# Patient Record
Sex: FEMALE | Race: WHITE | URBAN - METROPOLITAN AREA
[De-identification: names, ages, dates, MRNs, and addresses within clinical notes are randomized per-mention and may not be internally consistent; named-entity substitution may affect disease eponyms.]

---

## 2019-07-16 ENCOUNTER — DOCTOR'S OFFICE (OUTPATIENT)
Dept: URBAN - METROPOLITAN AREA CLINIC 136 | Facility: CLINIC | Age: 49
Setting detail: OPHTHALMOLOGY
End: 2019-07-16
Payer: COMMERCIAL

## 2019-07-16 DIAGNOSIS — H52.4: ICD-10-CM

## 2019-07-16 DIAGNOSIS — H52.13: ICD-10-CM

## 2019-07-16 PROCEDURE — 92014 COMPRE OPH EXAM EST PT 1/>: CPT | Performed by: OPTOMETRIST

## 2019-07-16 PROCEDURE — 92015 DETERMINE REFRACTIVE STATE: CPT | Performed by: OPTOMETRIST

## 2019-07-16 ASSESSMENT — KERATOMETRY
OS_K2POWER_DIOPTERS: 43.50
OD_K1POWER_DIOPTERS: 42.75
OD_K2POWER_DIOPTERS: 43.25
OS_AXISANGLE_DEGREES: 006
OD_AXISANGLE_DEGREES: 017
OS_K1POWER_DIOPTERS: 42.50

## 2019-07-16 ASSESSMENT — REFRACTION_CURRENTRX
OD_CYLINDER: -0.25
OS_OVR_VA: 20/
OD_OVR_VA: 20/
OS_VPRISM_DIRECTION: PROGS
OS_ADD: +1.25
OD_VPRISM_DIRECTION: PROGS
OD_AXIS: 040
OS_SPHERE: -5.75
OS_OVR_VA: 20/
OS_CYLINDER: -0.50
OS_OVR_VA: 20/
OD_SPHERE: -6.75
OD_OVR_VA: 20/
OS_AXIS: 180
OD_OVR_VA: 20/
OD_ADD: +1.25

## 2019-07-16 ASSESSMENT — SPHEQUIV_DERIVED
OD_SPHEQUIV: -7.375
OS_SPHEQUIV: -6.5
OD_SPHEQUIV: -7.125
OS_SPHEQUIV: -6.5

## 2019-07-16 ASSESSMENT — REFRACTION_AUTOREFRACTION
OD_CYLINDER: -0.75
OD_AXIS: 009
OD_SPHERE: -7.00
OS_SPHERE: -6.25
OS_AXIS: 011
OS_CYLINDER: -0.50

## 2019-07-16 ASSESSMENT — REFRACTION_MANIFEST
OS_AXIS: 180
OD_SPHERE: -7.00
OS_ADD: +2.00
OS_VA2: 20/
OS_VA1: 20/20
OS_CYLINDER: -0.50
OS_VA1: 20/
OD_VA2: 20/20 OU
OS_VA3: 20/
OD_VA3: 20/
OD_VA1: 20/
OD_VA2: 20/
OD_VA3: 20/
OS_VA3: 20/
OD_VA1: 20/20
OD_AXIS: 045
OU_VA: 20/
OS_VA2: 20/
OD_CYLINDER: -0.25
OS_SPHERE: -6.25
OD_ADD: +2.00
OU_VA: 20/

## 2019-07-16 ASSESSMENT — VISUAL ACUITY
OS_BCVA: 20/20-2
OD_BCVA: 20/30-2

## 2019-07-16 ASSESSMENT — SUPERFICIAL PUNCTATE KERATITIS (SPK)
OD_SPK: ABSENT
OS_SPK: ABSENT

## 2019-07-16 ASSESSMENT — AXIALLENGTH_DERIVED
OS_AL: 26.6614
OS_AL: 26.6614
OD_AL: 26.976
OD_AL: 27.104

## 2019-07-16 ASSESSMENT — INCREASING TEAR LAKE - SEVERITY SCORE
OS_INC_TEARLAKE: 1+
OD_INC_TEARLAKE: 1+

## 2019-07-16 ASSESSMENT — TEAR BREAK UP TIME (TBUT)
OS_TBUT: 6S
OD_TBUT: 6S

## 2019-07-16 ASSESSMENT — CONFRONTATIONAL VISUAL FIELD TEST (CVF)
OD_FINDINGS: FULL
OS_FINDINGS: FULL

## 2019-07-16 ASSESSMENT — LID EXAM ASSESSMENTS
OS_MEIBOMITIS: LUL T
OD_MEIBOMITIS: RUL 1+

## 2019-07-17 ENCOUNTER — OPTICAL OFFICE (OUTPATIENT)
Dept: URBAN - METROPOLITAN AREA CLINIC 143 | Facility: CLINIC | Age: 49
Setting detail: OPHTHALMOLOGY
End: 2019-07-17
Payer: COMMERCIAL

## 2019-07-17 DIAGNOSIS — H52.223: ICD-10-CM

## 2019-07-17 PROCEDURE — V2020 VISION SVCS FRAMES PURCHASES: HCPCS | Performed by: OPTOMETRIST

## 2019-07-17 PROCEDURE — V2783 LENS, >= 1.66 P/>=1.80 G: HCPCS | Performed by: OPTOMETRIST

## 2019-07-17 PROCEDURE — V2744 TINT PHOTOCHROMATIC LENS/ES: HCPCS | Performed by: OPTOMETRIST

## 2019-07-17 PROCEDURE — V2799 MISC VISION ITEM OR SERVICE: HCPCS | Performed by: OPTOMETRIST

## 2019-07-17 PROCEDURE — V2025 EYEGLASSES DELUX FRAMES: HCPCS | Performed by: OPTOMETRIST

## 2019-07-17 PROCEDURE — V2781 PROGRESSIVE LENS PER LENS: HCPCS | Performed by: OPTOMETRIST

## 2020-08-19 ENCOUNTER — DOCTOR'S OFFICE (OUTPATIENT)
Dept: URBAN - METROPOLITAN AREA CLINIC 136 | Facility: CLINIC | Age: 50
Setting detail: OPHTHALMOLOGY
End: 2020-08-19
Payer: COMMERCIAL

## 2020-08-19 DIAGNOSIS — H53.2: ICD-10-CM

## 2020-08-19 DIAGNOSIS — H04.123: ICD-10-CM

## 2020-08-19 DIAGNOSIS — G43.109: ICD-10-CM

## 2020-08-19 PROCEDURE — 92020 GONIOSCOPY: CPT | Performed by: OPHTHALMOLOGY

## 2020-08-19 PROCEDURE — 92014 COMPRE OPH EXAM EST PT 1/>: CPT | Performed by: OPHTHALMOLOGY

## 2020-08-19 ASSESSMENT — REFRACTION_CURRENTRX
OS_ADD: +1.25
OS_SPHERE: -5.75
OS_AXIS: 180
OD_VPRISM_DIRECTION: PROGS
OD_OVR_VA: 20/
OS_VPRISM_DIRECTION: PROGS
OS_CYLINDER: -0.50
OD_AXIS: 040
OD_SPHERE: -6.75
OD_ADD: +1.25
OD_CYLINDER: -0.25
OS_OVR_VA: 20/

## 2020-08-19 ASSESSMENT — REFRACTION_MANIFEST
OD_CYLINDER: -0.25
OS_VA1: 20/20
OS_AXIS: 180
OS_ADD: +2.00
OD_ADD: +2.00
OD_VA2: 20/20 OU
OS_SPHERE: -6.25
OD_SPHERE: -7.00
OD_VA1: 20/20
OD_AXIS: 045
OS_CYLINDER: -0.50

## 2020-08-19 ASSESSMENT — SPHEQUIV_DERIVED
OD_SPHEQUIV: -7.375
OS_SPHEQUIV: -6.5
OS_SPHEQUIV: -6.5
OD_SPHEQUIV: -7.125

## 2020-08-19 ASSESSMENT — KERATOMETRY
OS_K2POWER_DIOPTERS: 43.50
OS_AXISANGLE_DEGREES: 006
OS_K1POWER_DIOPTERS: 42.50
OD_AXISANGLE_DEGREES: 017
OD_K2POWER_DIOPTERS: 43.25
OD_K1POWER_DIOPTERS: 42.75

## 2020-08-19 ASSESSMENT — AXIALLENGTH_DERIVED
OD_AL: 26.976
OS_AL: 26.6614
OD_AL: 27.104
OS_AL: 26.6614

## 2020-08-19 ASSESSMENT — REFRACTION_AUTOREFRACTION
OD_AXIS: 009
OS_SPHERE: -6.25
OS_AXIS: 011
OD_CYLINDER: -0.75
OS_CYLINDER: -0.50
OD_SPHERE: -7.00

## 2020-08-19 ASSESSMENT — LID EXAM ASSESSMENTS
OS_MEIBOMITIS: LUL T
OD_MEIBOMITIS: RUL 1+

## 2020-08-19 ASSESSMENT — VISUAL ACUITY
OD_BCVA: 20/20-2
OS_BCVA: 20/20-2

## 2020-08-19 ASSESSMENT — CONFRONTATIONAL VISUAL FIELD TEST (CVF)
OS_FINDINGS: FULL
OD_FINDINGS: FULL

## 2020-08-19 ASSESSMENT — SUPERFICIAL PUNCTATE KERATITIS (SPK)
OD_SPK: ABSENT
OS_SPK: ABSENT

## 2020-08-19 ASSESSMENT — TEAR BREAK UP TIME (TBUT)
OS_TBUT: 6S
OD_TBUT: 6S

## 2020-08-19 ASSESSMENT — INCREASING TEAR LAKE - SEVERITY SCORE
OD_INC_TEARLAKE: 1+
OS_INC_TEARLAKE: 1+

## 2020-08-20 ENCOUNTER — DOCTOR'S OFFICE (OUTPATIENT)
Dept: URBAN - METROPOLITAN AREA CLINIC 136 | Facility: CLINIC | Age: 50
Setting detail: OPHTHALMOLOGY
End: 2020-08-20
Payer: COMMERCIAL

## 2020-08-20 DIAGNOSIS — H49.21: ICD-10-CM

## 2020-08-20 DIAGNOSIS — H04.122: ICD-10-CM

## 2020-08-20 DIAGNOSIS — H05.20: ICD-10-CM

## 2020-08-20 DIAGNOSIS — H53.2: ICD-10-CM

## 2020-08-20 DIAGNOSIS — H04.121: ICD-10-CM

## 2020-08-20 DIAGNOSIS — G43.109: ICD-10-CM

## 2020-08-20 PROCEDURE — 92014 COMPRE OPH EXAM EST PT 1/>: CPT | Performed by: OPHTHALMOLOGY

## 2020-08-20 ASSESSMENT — LID EXAM ASSESSMENTS
OS_MEIBOMITIS: LUL T
OD_MEIBOMITIS: RUL 1+

## 2020-08-20 ASSESSMENT — REFRACTION_MANIFEST
OD_VA1: 20/20
OD_CYLINDER: -0.25
OD_SPHERE: -7.00
OS_AXIS: 180
OS_SPHERE: -6.25
OD_AXIS: 045
OS_VA1: 20/20
OS_ADD: +2.00
OS_CYLINDER: -0.50
OD_VA2: 20/20 OU
OD_ADD: +2.00

## 2020-08-20 ASSESSMENT — SPHEQUIV_DERIVED
OS_SPHEQUIV: -6.5
OS_SPHEQUIV: -6.5
OD_SPHEQUIV: -7.125
OD_SPHEQUIV: -7.375

## 2020-08-20 ASSESSMENT — REFRACTION_CURRENTRX
OD_VPRISM_DIRECTION: PROGS
OS_AXIS: 180
OS_SPHERE: -5.75
OD_AXIS: 040
OS_ADD: +1.25
OS_VPRISM_DIRECTION: PROGS
OD_CYLINDER: -0.25
OS_CYLINDER: -0.50
OS_OVR_VA: 20/
OD_ADD: +1.25
OD_OVR_VA: 20/
OD_SPHERE: -6.75

## 2020-08-20 ASSESSMENT — CONFRONTATIONAL VISUAL FIELD TEST (CVF)
OS_FINDINGS: FULL
OD_FINDINGS: FULL

## 2020-08-20 ASSESSMENT — VISUAL ACUITY
OS_BCVA: 20/20-1
OD_BCVA: 20/20-2

## 2020-08-20 ASSESSMENT — SUPERFICIAL PUNCTATE KERATITIS (SPK)
OD_SPK: ABSENT
OS_SPK: ABSENT

## 2020-08-20 ASSESSMENT — KERATOMETRY
OD_K1POWER_DIOPTERS: 42.75
OS_AXISANGLE_DEGREES: 006
OD_K2POWER_DIOPTERS: 43.25
OS_K1POWER_DIOPTERS: 42.50
OD_AXISANGLE_DEGREES: 017
OS_K2POWER_DIOPTERS: 43.50

## 2020-08-20 ASSESSMENT — INCREASING TEAR LAKE - SEVERITY SCORE
OS_INC_TEARLAKE: 1+
OD_INC_TEARLAKE: 1+

## 2020-08-20 ASSESSMENT — REFRACTION_AUTOREFRACTION
OD_AXIS: 009
OS_AXIS: 011
OS_SPHERE: -6.25
OD_SPHERE: -7.00
OS_CYLINDER: -0.50
OD_CYLINDER: -0.75

## 2020-08-20 ASSESSMENT — AXIALLENGTH_DERIVED
OD_AL: 27.104
OS_AL: 26.6614
OS_AL: 26.6614
OD_AL: 26.976

## 2020-08-20 ASSESSMENT — TEAR BREAK UP TIME (TBUT)
OD_TBUT: 6S
OS_TBUT: 6S

## 2020-08-27 ENCOUNTER — DOCTOR'S OFFICE (OUTPATIENT)
Dept: URBAN - METROPOLITAN AREA CLINIC 136 | Facility: CLINIC | Age: 50
Setting detail: OPHTHALMOLOGY
End: 2020-08-27
Payer: COMMERCIAL

## 2020-08-27 ENCOUNTER — RX ONLY (RX ONLY)
Age: 50
End: 2020-08-27

## 2020-08-27 DIAGNOSIS — H49.21: ICD-10-CM

## 2020-08-27 DIAGNOSIS — H53.2: ICD-10-CM

## 2020-08-27 DIAGNOSIS — H05.20: ICD-10-CM

## 2020-08-27 DIAGNOSIS — H04.122: ICD-10-CM

## 2020-08-27 DIAGNOSIS — H04.121: ICD-10-CM

## 2020-08-27 DIAGNOSIS — G43.109: ICD-10-CM

## 2020-08-27 PROCEDURE — 92012 INTRM OPH EXAM EST PATIENT: CPT | Performed by: OPHTHALMOLOGY

## 2020-08-27 ASSESSMENT — LID EXAM ASSESSMENTS
OD_MEIBOMITIS: RUL 1+
OS_MEIBOMITIS: LUL T

## 2020-08-27 ASSESSMENT — TEAR BREAK UP TIME (TBUT)
OS_TBUT: 6S
OD_TBUT: 6S

## 2020-08-27 ASSESSMENT — CONFRONTATIONAL VISUAL FIELD TEST (CVF)
OS_FINDINGS: FULL
OD_FINDINGS: FULL

## 2020-08-27 ASSESSMENT — SUPERFICIAL PUNCTATE KERATITIS (SPK)
OS_SPK: ABSENT
OD_SPK: ABSENT

## 2020-08-27 ASSESSMENT — INCREASING TEAR LAKE - SEVERITY SCORE
OD_INC_TEARLAKE: 1+
OS_INC_TEARLAKE: 1+

## 2020-09-01 ASSESSMENT — REFRACTION_MANIFEST
OD_VA2: 20/20 OU
OD_CYLINDER: -0.25
OS_VA1: 20/20
OS_CYLINDER: -0.50
OD_VA1: 20/20
OS_ADD: +2.00
OS_AXIS: 180
OD_AXIS: 045
OS_SPHERE: -6.25
OD_SPHERE: -7.00
OD_ADD: +2.00

## 2020-09-01 ASSESSMENT — REFRACTION_CURRENTRX
OS_CYLINDER: -0.50
OS_ADD: +1.25
OD_OVR_VA: 20/
OD_SPHERE: -6.75
OS_AXIS: 180
OS_OVR_VA: 20/
OD_CYLINDER: -0.25
OD_VPRISM_DIRECTION: PROGS
OS_SPHERE: -5.75
OD_AXIS: 040
OD_ADD: +1.25
OS_VPRISM_DIRECTION: PROGS

## 2020-09-01 ASSESSMENT — KERATOMETRY
OS_K2POWER_DIOPTERS: 43.50
OD_AXISANGLE_DEGREES: 017
OS_K1POWER_DIOPTERS: 42.50
OD_K1POWER_DIOPTERS: 42.75
OS_AXISANGLE_DEGREES: 006
OD_K2POWER_DIOPTERS: 43.25

## 2020-09-01 ASSESSMENT — SPHEQUIV_DERIVED
OD_SPHEQUIV: -7.125
OD_SPHEQUIV: -7.375
OS_SPHEQUIV: -6.5
OS_SPHEQUIV: -6.5

## 2020-09-01 ASSESSMENT — AXIALLENGTH_DERIVED
OD_AL: 26.976
OS_AL: 26.6614
OD_AL: 27.104
OS_AL: 26.6614

## 2020-09-01 ASSESSMENT — VISUAL ACUITY
OD_BCVA: 20/20-1
OS_BCVA: 20/20-2

## 2020-09-01 ASSESSMENT — REFRACTION_AUTOREFRACTION
OS_SPHERE: -6.25
OS_CYLINDER: -0.50
OS_AXIS: 011
OD_SPHERE: -7.00
OD_CYLINDER: -0.75
OD_AXIS: 009

## 2020-09-17 ENCOUNTER — DOCTOR'S OFFICE (OUTPATIENT)
Dept: URBAN - METROPOLITAN AREA CLINIC 136 | Facility: CLINIC | Age: 50
Setting detail: OPHTHALMOLOGY
End: 2020-09-17
Payer: COMMERCIAL

## 2020-09-17 DIAGNOSIS — H53.2: ICD-10-CM

## 2020-09-17 DIAGNOSIS — H49.21: ICD-10-CM

## 2020-09-17 DIAGNOSIS — H04.123: ICD-10-CM

## 2020-09-17 DIAGNOSIS — H05.20: ICD-10-CM

## 2020-09-17 DIAGNOSIS — G43.109: ICD-10-CM

## 2020-09-17 PROCEDURE — 92012 INTRM OPH EXAM EST PATIENT: CPT | Performed by: OPHTHALMOLOGY

## 2020-09-17 ASSESSMENT — REFRACTION_CURRENTRX
OS_SPHERE: -5.75
OD_CYLINDER: -0.25
OS_OVR_VA: 20/
OD_SPHERE: -6.75
OD_OVR_VA: 20/
OS_ADD: +1.25
OD_AXIS: 040
OD_ADD: +1.25
OD_VPRISM_DIRECTION: PROGS
OS_AXIS: 180
OS_CYLINDER: -0.50
OS_VPRISM_DIRECTION: PROGS

## 2020-09-17 ASSESSMENT — KERATOMETRY
OD_K2POWER_DIOPTERS: 43.25
OD_K1POWER_DIOPTERS: 42.75
OS_K2POWER_DIOPTERS: 43.50
OD_AXISANGLE_DEGREES: 017
OS_AXISANGLE_DEGREES: 006
OS_K1POWER_DIOPTERS: 42.50

## 2020-09-17 ASSESSMENT — LID EXAM ASSESSMENTS
OS_MEIBOMITIS: LUL T
OD_MEIBOMITIS: RUL 1+

## 2020-09-17 ASSESSMENT — SPHEQUIV_DERIVED
OD_SPHEQUIV: -7.375
OS_SPHEQUIV: -6.5
OD_SPHEQUIV: -7.125
OS_SPHEQUIV: -6.5

## 2020-09-17 ASSESSMENT — CONFRONTATIONAL VISUAL FIELD TEST (CVF)
OS_FINDINGS: FULL
OD_FINDINGS: FULL

## 2020-09-17 ASSESSMENT — REFRACTION_AUTOREFRACTION
OS_AXIS: 011
OD_SPHERE: -7.00
OD_AXIS: 009
OD_CYLINDER: -0.75
OS_CYLINDER: -0.50
OS_SPHERE: -6.25

## 2020-09-17 ASSESSMENT — SUPERFICIAL PUNCTATE KERATITIS (SPK)
OD_SPK: ABSENT
OS_SPK: ABSENT

## 2020-09-17 ASSESSMENT — REFRACTION_MANIFEST
OD_AXIS: 045
OS_VA1: 20/20
OS_ADD: +2.00
OD_SPHERE: -7.00
OD_VA1: 20/20
OD_VA2: 20/20 OU
OD_ADD: +2.00
OS_SPHERE: -6.25
OS_CYLINDER: -0.50
OD_CYLINDER: -0.25
OS_AXIS: 180

## 2020-09-17 ASSESSMENT — VISUAL ACUITY
OD_BCVA: 20/20
OS_BCVA: 20/20

## 2020-09-17 ASSESSMENT — AXIALLENGTH_DERIVED
OS_AL: 26.6614
OD_AL: 27.104
OS_AL: 26.6614
OD_AL: 26.976

## 2020-09-17 ASSESSMENT — INCREASING TEAR LAKE - SEVERITY SCORE
OD_INC_TEARLAKE: 1+
OS_INC_TEARLAKE: 1+

## 2020-09-17 ASSESSMENT — TEAR BREAK UP TIME (TBUT)
OS_TBUT: 6S
OD_TBUT: 6S

## 2020-10-30 ENCOUNTER — DOCTOR'S OFFICE (OUTPATIENT)
Dept: URBAN - METROPOLITAN AREA CLINIC 136 | Facility: CLINIC | Age: 50
Setting detail: OPHTHALMOLOGY
End: 2020-10-30
Payer: COMMERCIAL

## 2020-10-30 ENCOUNTER — RX ONLY (RX ONLY)
Age: 50
End: 2020-10-30

## 2020-10-30 DIAGNOSIS — H05.20: ICD-10-CM

## 2020-10-30 DIAGNOSIS — D32.0: ICD-10-CM

## 2020-10-30 DIAGNOSIS — H53.2: ICD-10-CM

## 2020-10-30 DIAGNOSIS — G43.109: ICD-10-CM

## 2020-10-30 DIAGNOSIS — H04.122: ICD-10-CM

## 2020-10-30 DIAGNOSIS — H04.121: ICD-10-CM

## 2020-10-30 PROCEDURE — 92014 COMPRE OPH EXAM EST PT 1/>: CPT | Performed by: OPHTHALMOLOGY

## 2020-10-30 ASSESSMENT — LID EXAM ASSESSMENTS
OS_MEIBOMITIS: LUL T
OD_MEIBOMITIS: RUL 1+

## 2020-10-30 ASSESSMENT — REFRACTION_MANIFEST
OS_AXIS: 180
OD_AXIS: 045
OS_CYLINDER: -0.50
OD_VA1: 20/20
OD_ADD: +2.00
OD_VA2: 20/20 OU
OS_SPHERE: -6.25
OS_ADD: +2.00
OD_SPHERE: -7.00
OD_CYLINDER: -0.25
OS_VA1: 20/20

## 2020-10-30 ASSESSMENT — KERATOMETRY
OD_K1POWER_DIOPTERS: 42.75
OS_AXISANGLE_DEGREES: 006
OD_K2POWER_DIOPTERS: 43.25
OD_AXISANGLE_DEGREES: 017
OS_K2POWER_DIOPTERS: 43.50
OS_K1POWER_DIOPTERS: 42.50

## 2020-10-30 ASSESSMENT — REFRACTION_CURRENTRX
OD_SPHERE: -6.75
OS_VPRISM_DIRECTION: PROGS
OD_AXIS: 040
OS_AXIS: 180
OD_OVR_VA: 20/
OS_CYLINDER: -0.50
OS_SPHERE: -5.75
OD_ADD: +1.25
OD_VPRISM_DIRECTION: PROGS
OS_ADD: +1.25
OS_OVR_VA: 20/
OD_CYLINDER: -0.25

## 2020-10-30 ASSESSMENT — CONFRONTATIONAL VISUAL FIELD TEST (CVF)
OD_FINDINGS: FULL
OS_FINDINGS: FULL

## 2020-10-30 ASSESSMENT — SUPERFICIAL PUNCTATE KERATITIS (SPK)
OD_SPK: ABSENT
OS_SPK: ABSENT

## 2020-10-30 ASSESSMENT — REFRACTION_AUTOREFRACTION
OD_AXIS: 009
OD_SPHERE: -7.00
OD_CYLINDER: -0.75
OS_SPHERE: -6.25
OS_AXIS: 011
OS_CYLINDER: -0.50

## 2020-10-30 ASSESSMENT — SPHEQUIV_DERIVED
OS_SPHEQUIV: -6.5
OD_SPHEQUIV: -7.375
OS_SPHEQUIV: -6.5
OD_SPHEQUIV: -7.125

## 2020-10-30 ASSESSMENT — TEAR BREAK UP TIME (TBUT)
OD_TBUT: 6S
OS_TBUT: 6S

## 2020-10-30 ASSESSMENT — INCREASING TEAR LAKE - SEVERITY SCORE
OD_INC_TEARLAKE: 1+
OS_INC_TEARLAKE: 1+

## 2020-10-30 ASSESSMENT — VISUAL ACUITY
OS_BCVA: 20/25+1
OD_BCVA: 20/20-1

## 2020-10-30 ASSESSMENT — AXIALLENGTH_DERIVED
OD_AL: 26.976
OD_AL: 27.104
OS_AL: 26.6614
OS_AL: 26.6614

## 2020-11-09 ENCOUNTER — DOCTOR'S OFFICE (OUTPATIENT)
Dept: URBAN - METROPOLITAN AREA CLINIC 136 | Facility: CLINIC | Age: 50
Setting detail: OPHTHALMOLOGY
End: 2020-11-09
Payer: COMMERCIAL

## 2020-11-09 DIAGNOSIS — H49.21: ICD-10-CM

## 2020-11-09 DIAGNOSIS — G43.109: ICD-10-CM

## 2020-11-09 DIAGNOSIS — D32.0: ICD-10-CM

## 2020-11-09 DIAGNOSIS — H53.2: ICD-10-CM

## 2020-11-09 PROCEDURE — FRESNELPRI FRESNEL PRISM NEW: Performed by: OPHTHALMOLOGY

## 2020-11-09 PROCEDURE — 92060 SENSORIMOTOR EXAMINATION: CPT | Performed by: OPHTHALMOLOGY

## 2020-11-09 PROCEDURE — 99214 OFFICE O/P EST MOD 30 MIN: CPT | Performed by: OPHTHALMOLOGY

## 2020-11-09 ASSESSMENT — TONOMETRY
OS_IOP_MMHG: 16
OD_IOP_MMHG: 16

## 2020-11-09 ASSESSMENT — SUPERFICIAL PUNCTATE KERATITIS (SPK)
OS_SPK: ABSENT
OD_SPK: ABSENT

## 2020-11-09 ASSESSMENT — INCREASING TEAR LAKE - SEVERITY SCORE
OD_INC_TEARLAKE: 1+
OS_INC_TEARLAKE: 1+

## 2020-11-09 ASSESSMENT — CONFRONTATIONAL VISUAL FIELD TEST (CVF)
OS_FINDINGS: FULL
OD_FINDINGS: FULL

## 2020-11-09 ASSESSMENT — LID EXAM ASSESSMENTS
OS_MEIBOMITIS: LUL T
OD_MEIBOMITIS: RUL 1+

## 2020-11-09 ASSESSMENT — TEAR BREAK UP TIME (TBUT)
OD_TBUT: 6S
OS_TBUT: 6S

## 2020-11-12 ASSESSMENT — REFRACTION_CURRENTRX
OS_ADD: +2.00
OD_OVR_VA: 20/
OS_SPHERE: -6.25
OD_ADD: +2.00
OS_OVR_VA: 20/
OS_AXIS: 180
OS_VPRISM_DIRECTION: PROGS
OD_CYLINDER: -0.25
OS_CYLINDER: -0.50
OD_VPRISM_DIRECTION: PROGS
OD_AXIS: 040
OD_SPHERE: -7.00

## 2020-11-12 ASSESSMENT — AXIALLENGTH_DERIVED
OD_AL: 26.976
OD_AL: 26.8493
OS_AL: 26.5993
OS_AL: 26.6614

## 2020-11-12 ASSESSMENT — REFRACTION_MANIFEST
OD_VA1: 20/20
OS_VA1: 20/20
OD_SPHERE: -7.00
OS_ADD: +2.00
OS_AXIS: 180
OD_ADD: +2.00
OS_SPHERE: -6.25
OS_CYLINDER: -0.50
OD_VA2: 20/20 OU
OD_CYLINDER: -0.25
OD_AXIS: 045

## 2020-11-12 ASSESSMENT — REFRACTION_AUTOREFRACTION
OD_AXIS: 20
OS_CYLINDER: -0.75
OD_SPHERE: -6.50
OD_CYLINDER: -0.75
OS_AXIS: 23
OS_SPHERE: -6.00

## 2020-11-12 ASSESSMENT — VISUAL ACUITY
OS_BCVA: 20/20-1
OD_BCVA: 20/20-1

## 2020-11-12 ASSESSMENT — SPHEQUIV_DERIVED
OS_SPHEQUIV: -6.375
OD_SPHEQUIV: -6.875
OD_SPHEQUIV: -7.125
OS_SPHEQUIV: -6.5

## 2020-11-12 ASSESSMENT — KERATOMETRY
OS_K1POWER_DIOPTERS: 42.50
OD_K1POWER_DIOPTERS: 42.75
OD_AXISANGLE_DEGREES: 017
OS_K2POWER_DIOPTERS: 43.50
OD_K2POWER_DIOPTERS: 43.25
OS_AXISANGLE_DEGREES: 006

## 2020-11-16 ENCOUNTER — DOCTOR'S OFFICE (OUTPATIENT)
Dept: URBAN - METROPOLITAN AREA CLINIC 136 | Facility: CLINIC | Age: 50
Setting detail: OPHTHALMOLOGY
End: 2020-11-16
Payer: COMMERCIAL

## 2020-11-16 DIAGNOSIS — H53.2: ICD-10-CM

## 2020-11-16 DIAGNOSIS — G43.109: ICD-10-CM

## 2020-11-16 DIAGNOSIS — D32.0: ICD-10-CM

## 2020-11-16 DIAGNOSIS — H49.21: ICD-10-CM

## 2020-11-16 PROCEDURE — 92014 COMPRE OPH EXAM EST PT 1/>: CPT | Performed by: OPHTHALMOLOGY

## 2020-11-16 ASSESSMENT — AXIALLENGTH_DERIVED
OD_AL: 26.976
OD_AL: 26.8493
OS_AL: 26.6614
OS_AL: 26.5993

## 2020-11-16 ASSESSMENT — SPHEQUIV_DERIVED
OD_SPHEQUIV: -6.875
OS_SPHEQUIV: -6.5
OS_SPHEQUIV: -6.375
OD_SPHEQUIV: -7.125

## 2020-11-16 ASSESSMENT — INCREASING TEAR LAKE - SEVERITY SCORE
OS_INC_TEARLAKE: 1+
OD_INC_TEARLAKE: 1+

## 2020-11-16 ASSESSMENT — REFRACTION_CURRENTRX
OS_AXIS: 180
OD_AXIS: 040
OD_SPHERE: -7.00
OD_VPRISM_DIRECTION: PROGS
OS_ADD: +2.00
OS_SPHERE: -6.25
OD_OVR_VA: 20/
OS_CYLINDER: -0.50
OS_OVR_VA: 20/
OD_CYLINDER: -0.25
OS_VPRISM_DIRECTION: PROGS
OD_ADD: +2.00

## 2020-11-16 ASSESSMENT — REFRACTION_MANIFEST
OS_ADD: +2.00
OD_VA1: 20/20
OD_CYLINDER: -0.25
OD_VA2: 20/20 OU
OD_SPHERE: -7.00
OS_AXIS: 180
OS_VA1: 20/20
OS_SPHERE: -6.25
OD_ADD: +2.00
OD_AXIS: 045
OS_CYLINDER: -0.50

## 2020-11-16 ASSESSMENT — REFRACTION_AUTOREFRACTION
OS_CYLINDER: -0.75
OD_SPHERE: -6.50
OS_SPHERE: -6.00
OD_CYLINDER: -0.75
OS_AXIS: 23
OD_AXIS: 20

## 2020-11-16 ASSESSMENT — CONFRONTATIONAL VISUAL FIELD TEST (CVF)
OD_FINDINGS: FULL
OS_FINDINGS: FULL

## 2020-11-16 ASSESSMENT — SUPERFICIAL PUNCTATE KERATITIS (SPK)
OS_SPK: ABSENT
OD_SPK: ABSENT

## 2020-11-16 ASSESSMENT — LID EXAM ASSESSMENTS
OD_MEIBOMITIS: RUL 1+
OS_MEIBOMITIS: LUL T

## 2020-11-16 ASSESSMENT — KERATOMETRY
OS_K2POWER_DIOPTERS: 43.50
OD_K1POWER_DIOPTERS: 42.75
OS_AXISANGLE_DEGREES: 006
OS_K1POWER_DIOPTERS: 42.50
OD_AXISANGLE_DEGREES: 017
OD_K2POWER_DIOPTERS: 43.25

## 2020-11-16 ASSESSMENT — TONOMETRY
OS_IOP_MMHG: 16
OD_IOP_MMHG: 14

## 2020-11-16 ASSESSMENT — TEAR BREAK UP TIME (TBUT)
OD_TBUT: 6S
OS_TBUT: 6S

## 2020-11-16 ASSESSMENT — VISUAL ACUITY
OS_BCVA: 20/20-1
OD_BCVA: 20/20-1

## 2020-12-03 ENCOUNTER — DOCTOR'S OFFICE (OUTPATIENT)
Dept: URBAN - METROPOLITAN AREA CLINIC 136 | Facility: CLINIC | Age: 50
Setting detail: OPHTHALMOLOGY
End: 2020-12-03
Payer: COMMERCIAL

## 2020-12-03 DIAGNOSIS — G43.109: ICD-10-CM

## 2020-12-03 DIAGNOSIS — H49.21: ICD-10-CM

## 2020-12-03 DIAGNOSIS — D32.0: ICD-10-CM

## 2020-12-03 DIAGNOSIS — H53.2: ICD-10-CM

## 2020-12-03 PROCEDURE — 92014 COMPRE OPH EXAM EST PT 1/>: CPT | Performed by: OPHTHALMOLOGY

## 2020-12-03 PROCEDURE — 92083 EXTENDED VISUAL FIELD XM: CPT | Performed by: OPHTHALMOLOGY

## 2020-12-03 ASSESSMENT — REFRACTION_MANIFEST
OS_SPHERE: -6.25
OD_VA2: 20/20 OU
OS_CYLINDER: -0.50
OD_AXIS: 045
OS_AXIS: 180
OS_VA1: 20/20
OD_ADD: +2.00
OD_SPHERE: -7.00
OD_VA1: 20/20
OD_CYLINDER: -0.25
OS_ADD: +2.00

## 2020-12-03 ASSESSMENT — REFRACTION_AUTOREFRACTION
OS_AXIS: 23
OD_AXIS: 20
OS_SPHERE: -6.00
OD_SPHERE: -6.50
OS_CYLINDER: -0.75
OD_CYLINDER: -0.75

## 2020-12-03 ASSESSMENT — VISUAL ACUITY
OD_BCVA: 20/20-1
OS_BCVA: 20/20-1

## 2020-12-03 ASSESSMENT — KERATOMETRY
OS_K2POWER_DIOPTERS: 43.50
OD_K1POWER_DIOPTERS: 42.75
OD_K2POWER_DIOPTERS: 43.25
OS_AXISANGLE_DEGREES: 006
OS_K1POWER_DIOPTERS: 42.50
OD_AXISANGLE_DEGREES: 017

## 2020-12-03 ASSESSMENT — REFRACTION_CURRENTRX
OS_SPHERE: -6.25
OD_SPHERE: -7.00
OD_OVR_VA: 20/
OS_VPRISM_DIRECTION: PROGS
OD_ADD: +2.00
OS_ADD: +2.00
OS_CYLINDER: -0.50
OD_VPRISM_DIRECTION: PROGS
OS_AXIS: 180
OD_AXIS: 040
OD_CYLINDER: -0.25
OS_OVR_VA: 20/

## 2020-12-03 ASSESSMENT — TONOMETRY
OS_IOP_MMHG: 14
OD_IOP_MMHG: 14

## 2020-12-03 ASSESSMENT — TEAR BREAK UP TIME (TBUT)
OS_TBUT: 6S
OD_TBUT: 6S

## 2020-12-03 ASSESSMENT — SPHEQUIV_DERIVED
OS_SPHEQUIV: -6.375
OD_SPHEQUIV: -7.125
OS_SPHEQUIV: -6.5
OD_SPHEQUIV: -6.875

## 2020-12-03 ASSESSMENT — AXIALLENGTH_DERIVED
OS_AL: 26.6614
OD_AL: 26.976
OS_AL: 26.5993
OD_AL: 26.8493

## 2020-12-03 ASSESSMENT — LID EXAM ASSESSMENTS
OS_MEIBOMITIS: LUL T
OD_MEIBOMITIS: RUL 1+

## 2020-12-03 ASSESSMENT — INCREASING TEAR LAKE - SEVERITY SCORE
OS_INC_TEARLAKE: 1+
OD_INC_TEARLAKE: 1+

## 2020-12-03 ASSESSMENT — CONFRONTATIONAL VISUAL FIELD TEST (CVF)
OD_FINDINGS: FULL
OS_FINDINGS: FULL

## 2020-12-03 ASSESSMENT — SUPERFICIAL PUNCTATE KERATITIS (SPK)
OS_SPK: ABSENT
OD_SPK: ABSENT

## 2020-12-14 ENCOUNTER — DOCTOR'S OFFICE (OUTPATIENT)
Dept: URBAN - METROPOLITAN AREA CLINIC 136 | Facility: CLINIC | Age: 50
Setting detail: OPHTHALMOLOGY
End: 2020-12-14
Payer: COMMERCIAL

## 2020-12-14 DIAGNOSIS — H53.2: ICD-10-CM

## 2020-12-14 DIAGNOSIS — H49.21: ICD-10-CM

## 2020-12-14 DIAGNOSIS — D32.0: ICD-10-CM

## 2020-12-14 DIAGNOSIS — G43.109: ICD-10-CM

## 2020-12-14 PROCEDURE — 92012 INTRM OPH EXAM EST PATIENT: CPT | Performed by: OPHTHALMOLOGY

## 2020-12-14 ASSESSMENT — INCREASING TEAR LAKE - SEVERITY SCORE
OS_INC_TEARLAKE: 1+
OD_INC_TEARLAKE: 1+

## 2020-12-14 ASSESSMENT — SUPERFICIAL PUNCTATE KERATITIS (SPK)
OD_SPK: ABSENT
OS_SPK: ABSENT

## 2020-12-14 ASSESSMENT — TEAR BREAK UP TIME (TBUT)
OS_TBUT: 6S
OD_TBUT: 6S

## 2020-12-14 ASSESSMENT — CONFRONTATIONAL VISUAL FIELD TEST (CVF)
OS_FINDINGS: FULL
OD_FINDINGS: FULL

## 2020-12-14 ASSESSMENT — LID EXAM ASSESSMENTS
OD_MEIBOMITIS: RUL 1+
OS_MEIBOMITIS: LUL T

## 2020-12-15 ASSESSMENT — REFRACTION_CURRENTRX
OD_CYLINDER: -0.25
OD_HPRISM_DIRECTION: BO
OS_OVR_VA: 20/
OD_AXIS: 040
OS_CYLINDER: -0.50
OD_VPRISM_DIRECTION: PROGS
OD_OVR_VA: 20/
OD_ADD: +2.00
OS_ADD: +2.00
OS_SPHERE: -6.25
OS_VPRISM_DIRECTION: PROGS
OD_HPRISM: 1
OS_AXIS: 180
OD_SPHERE: -7.00

## 2020-12-15 ASSESSMENT — REFRACTION_AUTOREFRACTION
OD_CYLINDER: +0.75
OD_AXIS: 104
OS_SPHERE: -7.00
OD_SPHERE: -8.00
OS_AXIS: 101
OS_CYLINDER: +0.25

## 2020-12-15 ASSESSMENT — KERATOMETRY
OS_K1POWER_DIOPTERS: 42.50
OD_K2POWER_DIOPTERS: 43.25
OD_K1POWER_DIOPTERS: 42.75
OS_K2POWER_DIOPTERS: 43.50
OD_AXISANGLE_DEGREES: 017
OS_AXISANGLE_DEGREES: 006

## 2020-12-15 ASSESSMENT — REFRACTION_MANIFEST
OS_CYLINDER: -0.50
OD_CYLINDER: -0.25
OS_AXIS: 180
OS_SPHERE: -6.25
OD_ADD: +2.00
OD_VA1: 20/20
OD_AXIS: 045
OD_VA2: 20/20 OU
OS_ADD: +2.00
OS_VA1: 20/20
OD_SPHERE: -7.00

## 2020-12-15 ASSESSMENT — AXIALLENGTH_DERIVED
OS_AL: 26.8493
OD_AL: 27.2332
OS_AL: 26.6614
OD_AL: 26.976

## 2020-12-15 ASSESSMENT — VISUAL ACUITY
OD_BCVA: 20/20
OS_BCVA: 20/25-1

## 2020-12-15 ASSESSMENT — SPHEQUIV_DERIVED
OS_SPHEQUIV: -6.5
OD_SPHEQUIV: -7.125
OD_SPHEQUIV: -7.625
OS_SPHEQUIV: -6.875

## 2021-02-23 ENCOUNTER — DOCTOR'S OFFICE (OUTPATIENT)
Dept: URBAN - METROPOLITAN AREA CLINIC 125 | Facility: CLINIC | Age: 51
Setting detail: OPHTHALMOLOGY
End: 2021-02-23
Payer: COMMERCIAL

## 2021-02-23 DIAGNOSIS — H49.21: ICD-10-CM

## 2021-02-23 DIAGNOSIS — H53.2: ICD-10-CM

## 2021-02-23 PROBLEM — H04.122 DRY EYE; RIGHT EYE, LEFT EYE: Status: ACTIVE | Noted: 2020-08-20

## 2021-02-23 PROBLEM — H52.13 MYOPIA; BOTH EYES: Status: ACTIVE | Noted: 2019-07-16

## 2021-02-23 PROBLEM — H43.811 POSTERIOR VITREOUS DETACHMENT; RIGHT EYE: Status: ACTIVE | Noted: 2019-07-16

## 2021-02-23 PROBLEM — D32.0: Status: ACTIVE | Noted: 2020-10-30

## 2021-02-23 PROBLEM — H04.121 DRY EYE; RIGHT EYE, LEFT EYE: Status: ACTIVE | Noted: 2020-08-20

## 2021-02-23 PROBLEM — G43.109 MIGRAINE WITH AURA, NOT INTRACTABLE, WITHOUT STATUS MIGRAINOSUS: Status: ACTIVE | Noted: 2020-08-19

## 2021-02-23 PROBLEM — H05.20 EXOPHTHALMOS UNSPECIFIED: Status: ACTIVE | Noted: 2020-08-20

## 2021-02-23 PROBLEM — H35.413 LATTICE DEGENERATION; BOTH EYES: Status: ACTIVE | Noted: 2020-08-20

## 2021-02-23 PROCEDURE — 92012 INTRM OPH EXAM EST PATIENT: CPT | Performed by: OPHTHALMOLOGY

## 2021-02-23 ASSESSMENT — REFRACTION_CURRENTRX
OD_VPRISM_DIRECTION: PROGS
OD_HPRISM_DIRECTION: BO
OD_OVR_VA: 20/
OD_AXIS: 040
OS_OVR_VA: 20/
OS_SPHERE: -6.25
OD_SPHERE: -7.00
OS_ADD: +2.00
OD_HPRISM: 1
OD_ADD: +2.00
OS_VPRISM_DIRECTION: PROGS
OS_AXIS: 180
OS_CYLINDER: -0.50
OD_CYLINDER: -0.25

## 2021-02-23 ASSESSMENT — KERATOMETRY
OS_AXISANGLE_DEGREES: 006
OD_AXISANGLE_DEGREES: 017
OD_K2POWER_DIOPTERS: 43.25
OS_K1POWER_DIOPTERS: 42.50
OD_K1POWER_DIOPTERS: 42.75
OS_K2POWER_DIOPTERS: 43.50

## 2021-02-23 ASSESSMENT — REFRACTION_AUTOREFRACTION
OD_CYLINDER: +0.75
OD_SPHERE: -8.00
OS_SPHERE: -7.00
OS_CYLINDER: +0.25
OD_AXIS: 104
OS_AXIS: 101

## 2021-02-23 ASSESSMENT — SUPERFICIAL PUNCTATE KERATITIS (SPK)
OS_SPK: ABSENT
OD_SPK: ABSENT

## 2021-02-23 ASSESSMENT — REFRACTION_MANIFEST
OS_SPHERE: -6.25
OS_VA1: 20/20
OD_SPHERE: -7.00
OS_AXIS: 180
OD_VA2: 20/20 OU
OS_CYLINDER: -0.50
OD_ADD: +2.00
OS_ADD: +2.00
OD_CYLINDER: -0.25
OD_AXIS: 045
OD_VA1: 20/20

## 2021-02-23 ASSESSMENT — VISUAL ACUITY
OD_BCVA: 20/20-2
OS_BCVA: 20/30+2

## 2021-02-23 ASSESSMENT — INCREASING TEAR LAKE - SEVERITY SCORE
OS_INC_TEARLAKE: 1+
OD_INC_TEARLAKE: 1+

## 2021-02-23 ASSESSMENT — SPHEQUIV_DERIVED
OD_SPHEQUIV: -7.125
OD_SPHEQUIV: -7.625
OS_SPHEQUIV: -6.5
OS_SPHEQUIV: -6.875

## 2021-02-23 ASSESSMENT — TEAR BREAK UP TIME (TBUT)
OD_TBUT: 6S
OS_TBUT: 6S

## 2021-02-23 ASSESSMENT — AXIALLENGTH_DERIVED
OD_AL: 27.2332
OD_AL: 26.976
OS_AL: 26.6614
OS_AL: 26.8493

## 2023-02-07 ENCOUNTER — TELEPHONE (OUTPATIENT)
Dept: ADMINISTRATIVE | Facility: OTHER | Age: 53
End: 2023-02-07

## 2023-02-07 ENCOUNTER — OFFICE VISIT (OUTPATIENT)
Dept: FAMILY MEDICINE CLINIC | Facility: CLINIC | Age: 53
End: 2023-02-07

## 2023-02-07 VITALS
WEIGHT: 237 LBS | SYSTOLIC BLOOD PRESSURE: 140 MMHG | TEMPERATURE: 97.2 F | DIASTOLIC BLOOD PRESSURE: 78 MMHG | HEIGHT: 63 IN | BODY MASS INDEX: 41.99 KG/M2

## 2023-02-07 DIAGNOSIS — G89.29 CHRONIC MIDLINE LOW BACK PAIN WITH BILATERAL SCIATICA: ICD-10-CM

## 2023-02-07 DIAGNOSIS — Z11.4 SCREENING FOR HIV (HUMAN IMMUNODEFICIENCY VIRUS): ICD-10-CM

## 2023-02-07 DIAGNOSIS — E66.01 MORBID OBESITY (HCC): ICD-10-CM

## 2023-02-07 DIAGNOSIS — Z23 ENCOUNTER FOR IMMUNIZATION: ICD-10-CM

## 2023-02-07 DIAGNOSIS — Z11.59 NEED FOR HEPATITIS C SCREENING TEST: ICD-10-CM

## 2023-02-07 DIAGNOSIS — M54.41 CHRONIC MIDLINE LOW BACK PAIN WITH BILATERAL SCIATICA: ICD-10-CM

## 2023-02-07 DIAGNOSIS — D32.9 MENINGIOMA (HCC): ICD-10-CM

## 2023-02-07 DIAGNOSIS — Z13.1 SCREENING FOR DIABETES MELLITUS: ICD-10-CM

## 2023-02-07 DIAGNOSIS — Z00.00 ANNUAL PHYSICAL EXAM: Primary | ICD-10-CM

## 2023-02-07 DIAGNOSIS — Z12.4 SCREENING FOR CERVICAL CANCER: ICD-10-CM

## 2023-02-07 DIAGNOSIS — Z13.220 SCREENING, LIPID: ICD-10-CM

## 2023-02-07 DIAGNOSIS — M54.42 CHRONIC MIDLINE LOW BACK PAIN WITH BILATERAL SCIATICA: ICD-10-CM

## 2023-02-07 PROBLEM — U07.1 COVID-19: Status: RESOLVED | Noted: 2022-10-25 | Resolved: 2023-02-07

## 2023-02-07 PROBLEM — J01.00 ACUTE NON-RECURRENT MAXILLARY SINUSITIS: Status: RESOLVED | Noted: 2019-05-14 | Resolved: 2023-02-07

## 2023-02-07 NOTE — PROGRESS NOTES
[address] [facility] PRIMARY CARE    NAME: Rai Lau  AGE: 46 y o  SEX: female  : 1970     DATE: 2023     Assessment and Plan:     Problem List Items Addressed This Visit        Nervous and Auditory    Meningioma ([facility] [address] )     Continue yearly followup with neurosurgery         Chronic midline low back pain with bilateral sciatica     Refer to PT         Relevant Orders    Ambulatory Referral to Physical Therapy       Other    Annual physical exam - Primary     Colonoscopy is up to date Patient declines covid flu and shingles Will up date adacel today Patient to have yearly mammogram I referred her to GYN as she needs pap          Morbid obesity ([facility] [address] )     Discussed need for diet and exercise Offered nutrition consult and patient has declined Will check labs also        Other Visit Diagnoses     Need for hepatitis C screening test        Relevant Orders    Hepatitis C Antibody ([facility])    Screening for HIV (human immunodeficiency virus)        Relevant Orders    HIV 1/2 AG/AB w Reflex [facility] for 2 yr old and above    Encounter for immunization        Relevant Orders    TDAP VACCINE GREATER THAN OR EQUAL TO 8YO IM    Screening for cervical cancer        Relevant Orders    Ambulatory referral to Obstetrics / Gynecology    Screening for diabetes mellitus        Relevant Orders    Comprehensive metabolic panel    Screening, lipid        Relevant Orders    Lipid panel          Immunizations and preventive care screenings were discussed with patient today  Appropriate education was printed on patient's after visit summary  Counseling:  Alcohol/drug use: discussed moderation in alcohol intake, the recommendations for healthy alcohol use, and avoidance of illicit drug use  Dental Health: discussed importance of regular tooth brushing, flossing, and dental visits    Injury prevention: discussed safety/seat belts, safety helmets, smoke detectors, carbon dioxide detectors, and smoking near bedding or upholstery  Sexual health: discussed sexually transmitted diseases, partner selection, use of condoms, avoidance of unintended pregnancy, and contraceptive alternatives  · Exercise: the importance of regular exercise/physical activity was discussed  Recommend exercise 3-5 times per week for at least 30 minutes  BMI Counseling: Body mass index is 42 66 kg/m²  The BMI is above normal  Nutrition recommendations include decreasing portion sizes and moderation in carbohydrate intake  Rationale for BMI follow-up plan is due to patient being overweight or obese  Depression Screening and Follow-up Plan: Patient was screened for depression during today's encounter  They screened negative with a PHQ-2 score of 0  Return in 1 year (on 2/7/2024) for Annual physical      Chief Complaint:     Chief Complaint   Patient presents with   • Annual Exam      History of Present Illness:     Adult Annual Physical   Patient here for a comprehensive physical exam  The patient reports patient complains of chronic low back pain with bilateral sciatica for last 6 months Patient has had [employer] [address] [employer] job and notes moving around make back feel better She has gained 29 pounds in the last 5 yrs   Diet and Physical Activity  · Diet/Nutrition: well balanced diet  · Exercise: no formal exercise  Depression Screening  PHQ-2/9 Depression Screening    Little interest or pleasure in doing things: 0 - not at all  Feeling down, depressed, or hopeless: 0 - not at all  PHQ-2 Score: 0  PHQ-2 Interpretation: Negative depression screen       General Health  · Sleep: sleeps well and gets 7-8 hours of sleep on average  · Hearing: normal - bilateral   · Vision: goes for regular eye exams and wears glasses  · Dental: regular dental visits and brushes teeth twice daily         /GYN Health  · Patient is: postmenopausal  · Last menstrual period: 4-5 yrs ago  · Contraceptive method: tubal and menopausal      Review of Systems:     Review of Systems   Constitutional: Negative for fatigue, fever and unexpected weight change  HENT: Negative for congestion, sinus pain and trouble swallowing  Eyes: Negative for discharge and visual disturbance  Respiratory: Negative for cough, chest tightness, shortness of breath and wheezing  Cardiovascular: Negative for chest pain, palpitations and leg swelling  Gastrointestinal: Negative for abdominal pain, blood in stool, constipation, diarrhea, nausea and vomiting  Genitourinary: Negative for difficulty urinating, dysuria, frequency and hematuria  Musculoskeletal: Positive for back pain  Negative for arthralgias, gait problem and joint swelling  Skin: Negative for rash and wound  Allergic/Immunologic: Negative for environmental allergies and food allergies  Neurological: Negative for dizziness, syncope, weakness, numbness and headaches  Hematological: Negative for adenopathy  Does not bruise/bleed easily  Psychiatric/Behavioral: Negative for confusion, decreased concentration and sleep disturbance  The patient is not nervous/anxious  Past Medical History:     Past Medical History:   Diagnosis Date   • Abnormal weight loss     Resolved 7/15/2015    • Acrochordon     Right axillary x2   Resolved 7/15/2015    • Former smoker    • Gastroparesis     Resolved 2/1/2017    • Neoplasm of skin of back     Resolved 9/12/2014    • Walking pneumonia     Resolved 2/1/2017       Past Surgical History:     Past Surgical History:   Procedure Laterality Date   • ARTHROSCOPY KNEE     • COLONOSCOPY  02/03/2015   • ELBOW BURSA SURGERY     • ESOPHAGOGASTRODUODENOSCOPY  10/20/2014    Diagnostic    • SHOULDER ARTHROSCOPY     • THORACIC OUTLET SURGERY     • TUBAL LIGATION     • WRIST SURGERY Right       Social History:     Social History     Socioeconomic History   • Marital status:      Spouse name: None   • Number of children: None   • Years of education: None   • Highest education level: None   Occupational History   • None   Tobacco Use   • Smoking status: Former     Passive exposure: Never   • Smokeless tobacco: Never   Vaping Use   • Vaping Use: Never used   Substance and Sexual Activity   • Alcohol use: Yes     Alcohol/week: 1 0 standard drink     Types: 1 Glasses of wine per week     Comment: 1 drink wine nightly   • Drug use: Never     Comment: Denied: History of drug use - As per [employer]    • Sexual activity: Yes     Partners: Male     Birth control/protection: Post-menopausal   Other Topics Concern   • None   Social History Narrative   • None     Social Determinants of Health     Financial Resource Strain: Not on file   Food Insecurity: Not on file   Transportation Needs: Not on file   Physical Activity: Not on file   Stress: Not on file   Social Connections: Not on file   Intimate Partner Violence: Not on file   Housing Stability: Not on file      Family History:     Family History   Problem Relation Age of Onset   • Breast cancer Mother    • Hypertension Father    • Cancer Father    • No Known Problems Brother    • No Known Problems Maternal Grandmother    • No Known Problems Maternal Grandfather    • Hypertension Paternal Grandmother    • Dementia Paternal Grandmother    • No Known Problems Paternal Grandfather       Current Medications:     Current Outpatient Medications   Medication Sig Dispense Refill   • EPINEPHrine (EPIPEN) 0 3 mg/0 3 mL SOAJ Inject 0 3 mL (0 3 mg total) into a muscle once for 1 dose 2 each 1     No current facility-administered medications for this visit  Allergies: Allergies   Allergen Reactions   • Honey Bee Venom Protein  [Bee Venom] Hives   • Tobramycin Other (See Comments)     Bubble on eye        Physical Exam:     /78   Temp (!) 97 2 °F (36 2 °C)   Ht 5' 2 5" (1 588 m)   Wt 108 kg (237 lb)   BMI 42 66 kg/m²     Physical Exam  Vitals and nursing note reviewed     Constitutional: Appearance: She is well-developed  She is obese  HENT:      Head: Normocephalic and atraumatic  Right Ear: External ear normal       Left Ear: External ear normal       Nose: Nose normal       Mouth/Throat:      Pharynx: No oropharyngeal exudate  Eyes:      Extraocular Movements: Extraocular movements intact  Conjunctiva/sclera: Conjunctivae normal       Pupils: Pupils are equal, round, and reactive to light  Neck:      Thyroid: No thyromegaly  Trachea: No tracheal deviation  Cardiovascular:      Rate and Rhythm: Normal rate and regular rhythm  Heart sounds: Normal heart sounds  Pulmonary:      Effort: Pulmonary effort is normal  No respiratory distress  Breath sounds: Normal breath sounds  No wheezing or rales  Abdominal:      General: Bowel sounds are normal       Palpations: Abdomen is soft  Musculoskeletal:      Cervical back: Normal range of motion and neck supple  Comments: Patient with bilateral low back pain to palpation with some PSIS tenderness Negative straight leg raising normal toe and heel walking Pateint with decrease ROM with flexion and extension   Lymphadenopathy:      Cervical: No cervical adenopathy  Skin:     General: Skin is warm  Findings: No rash  Neurological:      General: No focal deficit present  Mental Status: She is alert and oriented to person, place, and time  Cranial Nerves: No cranial nerve deficit  Motor: No abnormal muscle tone  Psychiatric:         Mood and Affect: Mood normal          Behavior: Behavior normal          Thought Content:  Thought content normal          Judgment: Judgment normal           DO [employer] [facility] PRIMARY CARE

## 2023-02-07 NOTE — TELEPHONE ENCOUNTER
----- Message from Elroy Eddy sent at 2/7/2023  9:57 AM EST -----  Regarding: care gap request - colonoscopy  02/07/23 9:57 AM    Hello, our patient attached above has had CRC: Colonoscopy completed/performed  Please assist in updating the patient chart by pulling the document from encounter Tab within Chart Review  The date of service is 02/03/2015       Thank you,  [address] [place] [address]

## 2023-02-07 NOTE — PROGRESS NOTES
Chief Complaint   Patient presents with   • Annual Exam     Name: Sierra Nguyen      : 1970      MRN: [id number]  Encounter Provider: Leatha Gaviria DO  Encounter Date: 2023   Encounter department: [facility] PRIMARY CARE    Assessment & Plan     1  Need for hepatitis C screening test    2  Screening for HIV (human immunodeficiency virus)    3  Encounter for immunization    4   Screening for cervical cancer           Subjective      HPI  Review of Systems    Current Outpatient Medications on File Prior to Visit   Medication Sig   • EPINEPHrine (EPIPEN) 0 3 mg/0 3 mL SOAJ Inject 0 3 mL (0 3 mg total) into a muscle once for 1 dose   • brompheniramine-pseudoephedrine-DM 30-2-10 MG/5ML syrup Take 5 mL by mouth 4 (four) times a day as needed for congestion or cough (Patient not taking: Reported on 2023)       Objective     /78   Temp (!) 97 2 °F (36 2 °C)   Ht 5' 2 5" (1 588 m)   Wt 108 kg (237 lb)   BMI 42 66 kg/m²     Physical Exam  Leatha Gaviria DO

## 2023-02-07 NOTE — PATIENT INSTRUCTIONS

## 2023-02-07 NOTE — ASSESSMENT & PLAN NOTE
Discussed need for diet and exercise Offered nutrition consult and patient has declined Will check labs also

## 2023-02-07 NOTE — TELEPHONE ENCOUNTER
Upon review of the In Basket request we were able to locate, review, and update the patient chart as requested for CRC: Colonoscopy  Any additional questions or concerns should be emailed to the Practice Liaisons via the appropriate education email address, please do not reply via In Basket      Thank you  Sonal Singh

## 2023-02-08 ENCOUNTER — APPOINTMENT (OUTPATIENT)
Dept: LAB | Facility: CLINIC | Age: 53
End: 2023-02-08

## 2023-02-08 DIAGNOSIS — Z13.1 SCREENING FOR DIABETES MELLITUS: ICD-10-CM

## 2023-02-08 DIAGNOSIS — Z11.4 SCREENING FOR HIV (HUMAN IMMUNODEFICIENCY VIRUS): ICD-10-CM

## 2023-02-08 DIAGNOSIS — Z11.59 NEED FOR HEPATITIS C SCREENING TEST: ICD-10-CM

## 2023-02-08 DIAGNOSIS — Z13.220 SCREENING, LIPID: ICD-10-CM

## 2023-02-08 LAB
ALBUMIN SERPL BCP-MCNC: 3.9 G/DL (ref 3.5–5)
ALP SERPL-CCNC: 55 U/L (ref 46–116)
ALT SERPL W P-5'-P-CCNC: 26 U/L (ref 12–78)
ANION GAP SERPL CALCULATED.3IONS-SCNC: 4 MMOL/L (ref 4–13)
AST SERPL W P-5'-P-CCNC: 21 U/L (ref 5–45)
BILIRUB SERPL-MCNC: 0.43 MG/DL (ref 0.2–1)
BUN SERPL-MCNC: 19 MG/DL (ref 5–25)
CALCIUM SERPL-MCNC: 9.6 MG/DL (ref 8.3–10.1)
CHLORIDE SERPL-SCNC: 105 MMOL/L (ref 96–108)
CHOLEST SERPL-MCNC: 184 MG/DL
CO2 SERPL-SCNC: 26 MMOL/L (ref 21–32)
CREAT SERPL-MCNC: 1.08 MG/DL (ref 0.6–1.3)
GFR SERPL CREATININE-BSD FRML MDRD: 59 ML/MIN/1.73SQ M
GLUCOSE P FAST SERPL-MCNC: 111 MG/DL (ref 65–99)
HCV AB SER QL: NORMAL
HDLC SERPL-MCNC: 66 MG/DL
HIV 1+2 AB+HIV1 P24 AG SERPL QL IA: NORMAL
HIV 2 AB SERPL QL IA: NORMAL
HIV1 AB SERPL QL IA: NORMAL
HIV1 P24 AG SERPL QL IA: NORMAL
LDLC SERPL CALC-MCNC: 108 MG/DL (ref 0–100)
NONHDLC SERPL-MCNC: 118 MG/DL
POTASSIUM SERPL-SCNC: 4.5 MMOL/L (ref 3.5–5.3)
PROT SERPL-MCNC: 7.6 G/DL (ref 6.4–8.4)
SODIUM SERPL-SCNC: 135 MMOL/L (ref 135–147)
TRIGL SERPL-MCNC: 52 MG/DL

## 2023-02-13 DIAGNOSIS — T78.2XXS ANAPHYLAXIS, SEQUELA: ICD-10-CM

## 2023-02-13 RX ORDER — EPINEPHRINE 0.3 MG/.3ML
0.3 INJECTION SUBCUTANEOUS ONCE
Qty: 2 EACH | Refills: 0 | Status: SHIPPED | OUTPATIENT
Start: 2023-02-13 | End: 2023-02-13

## 2023-02-17 ENCOUNTER — EVALUATION (OUTPATIENT)
Dept: PHYSICAL THERAPY | Facility: MEDICAL CENTER | Age: 53
End: 2023-02-17

## 2023-02-17 DIAGNOSIS — M54.50 CHRONIC MIDLINE LOW BACK PAIN, UNSPECIFIED WHETHER SCIATICA PRESENT: ICD-10-CM

## 2023-02-17 DIAGNOSIS — G89.29 CHRONIC MIDLINE LOW BACK PAIN, UNSPECIFIED WHETHER SCIATICA PRESENT: ICD-10-CM

## 2023-02-17 NOTE — TELEPHONE ENCOUNTER
Additional Information  • Negative: Is this related to a work injury? • Negative: Is this related to an MVA? • Negative: Are you currently recieving homecare services? • Negative: Has the patient had unexplained weight loss? • Negative: Does the patient have a fever? • Negative: Is the patient experiencing urine retention?     Protocols used: [facility] COMPREHENSIVE SPINE PROGRAM PROTOCOL

## 2023-02-17 NOTE — TELEPHONE ENCOUNTER
Additional Information  • Negative: Is this related to a work injury? • Negative: Is this related to an MVA?     Protocols used: [facility] COMPREHENSIVE SPINE PROGRAM PROTOCOL

## 2023-02-17 NOTE — PROGRESS NOTES
PT Evaluation     Today's date: 2023  Patient name: Juany Noel  : 1970  MRN: [id number]  Referring provider: Abbey Burdick DO  Dx:   Encounter Diagnosis     ICD-10-CM    1  Chronic midline low back pain, unspecified whether sciatica present  M54 50 Ambulatory Referral to Physical Therapy    G89 29                      Assessment/Plan  Patient is a very pleasant 47 yo female who presents with symptoms of chronic lower back pain as well as general deconditioning secondary to several months of sitting for work 10+ hours a day  Patient's symptoms are consistent with general hypomobility of lumbar and thoracic spine  Patient demonstrates poor posturing and poor ergonomics at working desk  She was instructed on posturing and it was suggested that she get a sit stand desk for home  Patient will benefit from skilled PT intervention to address her issues of pain and return her to normalized function without pain  Patient should be seen 2x a week for the next 4-6 weeks  Subjective  Patient states that she has been having issues of severe pain in her lower back from months of sitting  She feels as though she is unable to perform ADL's and would like to go back to exercise and daily function without pain  Patient notes that her back pain can be in her legs at times but does not travel below her hips  Patient has come to [facility] to address these issues and get a program that she can follow at home  Objective  Red Flags: none  Pain: [phone]  Reflexes:     Right Left   Biceps 2+ 2+   Triceps 2+ 2+   Brachioradialis 2+ 2+   Patellar 1+ 1+   Achilles 1+ 1+   Inverted supinator sign neg neg   clonus neg neg   Babinski neg neg      Posture: loss of lumbar and cervical lordosis   AROM: full flexion with some discomfort  Unable to extend lumbar spine  Pain with lateral flexion B   PROM: deferred  PAROM: diminished with all lumbar testing throughout  Patient did not tolerate prone lying  Palpation: TTP throughout musculature of lumbar spine  Special Tests: no neurological findings  No signs of radiculopathy  Coordination of Movement/strengthe:Patient demonstrates poor activation of Transversus Abdominus and multifidus force couple and loss of feed forward mechanism with reaching tasks  Patient was able to perform activation with cueing     Goals:  - Pt I with initial HEP in 1-2 visits  - pain free ROM  - improved stabilizing structure activation and improved feed forward mechanism with distal activation  - Increase Functional Status Measure measured by FOTO by [facility]  - return to exercise              Precautions: TOS surgery years ago

## 2023-02-17 NOTE — TELEPHONE ENCOUNTER
Additional Information  • Negative: Is this related to a work injury? • Negative: Is this related to an MVA? • Negative: Are you currently recieving homecare services? • Negative: Has the patient had unexplained weight loss? • Negative: Does the patient have a fever? • Negative: Is the patient experiencing urine retention? • Negative: Is the patient experiencing acute drop foot or paralysis? • Negative: Has the patient experienced major trauma? (fall from height, high speed collision, direct blow to spine) and is also experiencing nausea, light-headedness, or loss of consciousness? • Negative: Is the patient experiencing blood in sputum?     Protocols used: [facility] COMPREHENSIVE SPINE PROGRAM PROTOCOL

## 2023-02-17 NOTE — TELEPHONE ENCOUNTER
Additional Information  • Negative: Is this related to a work injury? • Negative: Is this related to an MVA? • Negative: Are you currently recieving homecare services? • Negative: Has the patient had unexplained weight loss?     Protocols used:  [facility] COMPREHENSIVE SPINE PROGRAM PROTOCOL

## 2023-02-17 NOTE — TELEPHONE ENCOUNTER
Additional Information  • Negative: Is this related to a work injury?     Protocols used: [facility] COMPREHENSIVE SPINE PROGRAM PROTOCOL

## 2023-02-17 NOTE — TELEPHONE ENCOUNTER
Additional Information  • Negative: Is this related to a work injury? • Negative: Is this related to an MVA? • Negative: Are you currently recieving homecare services? • Negative: Has the patient had unexplained weight loss? • Negative: Does the patient have a fever?     Protocols used: [facility] COMPREHENSIVE SPINE PROGRAM PROTOCOL

## 2023-02-17 NOTE — TELEPHONE ENCOUNTER
Additional Information  • Negative: Is this related to a work injury? • Negative: Is this related to an MVA? • Negative: Are you currently recieving homecare services? • Negative: Has the patient had unexplained weight loss? • Negative: Does the patient have a fever? • Negative: Is the patient experiencing urine retention? • Negative: Is the patient experiencing acute drop foot or paralysis?     Protocols used: [facility] COMPREHENSIVE SPINE PROGRAM PROTOCOL

## 2023-02-17 NOTE — TELEPHONE ENCOUNTER
Additional Information  • Negative: Is this related to a work injury? • Negative: Is this related to an MVA? • Negative: Are you currently recieving homecare services?     Protocols used: [facility] COMPREHENSIVE SPINE PROGRAM PROTOCOL

## 2023-02-17 NOTE — TELEPHONE ENCOUNTER
Additional Information  • Negative: Is this related to a work injury? • Negative: Is this related to an MVA? • Negative: Are you currently recieving homecare services? • Negative: Has the patient had unexplained weight loss? • Negative: Does the patient have a fever? • Negative: Is the patient experiencing urine retention? • Negative: Is the patient experiencing acute drop foot or paralysis? • Negative: Has the patient experienced major trauma? (fall from height, high speed collision, direct blow to spine) and is also experiencing nausea, light-headedness, or loss of consciousness?     Protocols used: [facility] COMPREHENSIVE SPINE PROGRAM PROTOCOL

## 2023-02-17 NOTE — TELEPHONE ENCOUNTER
Additional Information  • Negative: Is this related to a work injury? • Negative: Is this related to an MVA? • Negative: Are you currently recieving homecare services? • Negative: Has the patient had unexplained weight loss? • Negative: Does the patient have a fever? • Negative: Is the patient experiencing urine retention? • Negative: Is the patient experiencing acute drop foot or paralysis? • Negative: Has the patient experienced major trauma? (fall from height, high speed collision, direct blow to spine) and is also experiencing nausea, light-headedness, or loss of consciousness? • Negative: Is the patient experiencing blood in sputum? • Affirmative: Is this a chronic condition?     Protocols used: [facility] COMPREHENSIVE SPINE PROGRAM PROTOCOL

## 2023-02-20 ENCOUNTER — OFFICE VISIT (OUTPATIENT)
Dept: PHYSICAL THERAPY | Facility: MEDICAL CENTER | Age: 53
End: 2023-02-20

## 2023-02-20 DIAGNOSIS — G89.29 CHRONIC MIDLINE LOW BACK PAIN, UNSPECIFIED WHETHER SCIATICA PRESENT: Primary | ICD-10-CM

## 2023-02-20 DIAGNOSIS — M54.50 CHRONIC MIDLINE LOW BACK PAIN, UNSPECIFIED WHETHER SCIATICA PRESENT: Primary | ICD-10-CM

## 2023-02-20 NOTE — PROGRESS NOTES
Daily Note     Today's date: 2023  Patient name: Manohar Atkinson  : 1970  MRN: [id number]  Referring provider: Viki Aguillon DO  Dx:   Encounter Diagnosis     ICD-10-CM    1  Chronic midline low back pain, unspecified whether sciatica present  M54 50     G89 29                      Subjective: patient states that she is feeling sore and is having issues with ROM  Objective: See treatment diary below      Assessment: Tolerated treatment well  Patient demonstrated fatigue post treatment, exhibited good technique with therapeutic exercises and would benefit from continued PT      Plan: Continue per plan of care        Precautions: TOS surgery years ago      Daily Treatment Diary     Manual                                                                                   Exercise Diary              Hip flexor str 10sec x5            Hamstring str 10sec x5            Piriformis str 10sec x5            LTR 10sec x5            SKTC 10sec x5            SLR             Bridging with ball between knees             Clamshell with band             Standing hip abduction 10            Standing hip extension 10                                      eliptical  7min                                                                                                           Modalities                           Moist heat

## 2023-02-22 ENCOUNTER — OFFICE VISIT (OUTPATIENT)
Dept: PHYSICAL THERAPY | Facility: MEDICAL CENTER | Age: 53
End: 2023-02-22

## 2023-02-22 DIAGNOSIS — M54.50 CHRONIC MIDLINE LOW BACK PAIN, UNSPECIFIED WHETHER SCIATICA PRESENT: Primary | ICD-10-CM

## 2023-02-22 DIAGNOSIS — G89.29 CHRONIC MIDLINE LOW BACK PAIN, UNSPECIFIED WHETHER SCIATICA PRESENT: Primary | ICD-10-CM

## 2023-02-22 NOTE — PROGRESS NOTES
Daily Note     Today's date: 2023  Patient name: Iain Carrizales  : 1970  MRN: [id number]  Referring provider: Kisha Ruiz DO  Dx:   Encounter Diagnosis     ICD-10-CM    1  Chronic midline low back pain, unspecified whether sciatica present  M54 50     G89 29                      Subjective: patient states that she is feeling better and working on her HEP  Objective: See treatment diary below      Assessment: Tolerated treatment well  Patient demonstrated fatigue post treatment, exhibited good technique with therapeutic exercises and would benefit from continued PT  Making good gains and progressed with HEP  Plan: Continue per plan of care        Precautions: TOS surgery years ago      Daily Treatment Diary     Manual                                                                                   Exercise Diary              Hip flexor str 10sec x5            Hamstring str 10sec x5            Piriformis str 10sec x5            LTR 10sec x5            SKTC 10sec x5            SLR 10x            Bridging with ball between knees 10x            Clamshell with band 10x            Standing hip abduction 10            Standing hip extension 10            Stability chest press 15x2                         eliptical  7min                                                                                                           Modalities                           Moist heat

## 2023-02-28 ENCOUNTER — APPOINTMENT (OUTPATIENT)
Dept: PHYSICAL THERAPY | Facility: MEDICAL CENTER | Age: 53
End: 2023-02-28

## 2023-03-02 ENCOUNTER — APPOINTMENT (OUTPATIENT)
Dept: PHYSICAL THERAPY | Facility: MEDICAL CENTER | Age: 53
End: 2023-03-02

## 2023-03-07 ENCOUNTER — APPOINTMENT (OUTPATIENT)
Dept: PHYSICAL THERAPY | Facility: MEDICAL CENTER | Age: 53
End: 2023-03-07

## 2023-03-07 NOTE — PROGRESS NOTES
A/P    1  Annual exam    Last PAP - ? Next due  Today with co testing    Never had abnormal    Scheduling of pap discussed in detail      Mammogram - last  9/7/22-# 1   Next do slipped    Self breast exams discussed     Colonoscopy - 2/3/2015- x 10 years  2   Pretty regular cycles    Went over the way we expect it to change and what to look out for       52 y o ,No LMP recorded  C/O no gyn complaints       Past medical / social / surgical / family history reviewed and updated   Medication and allergies discussed in detail and updated       Review of Systems - History obtained from chart review and the patient  General ROS: negative  Psychological ROS: negative  Ophthalmic ROS: negative  ENT ROS: negative  Allergy and Immunology ROS: negative  Hematological and Lymphatic ROS: negative  Endocrine ROS: negative  Breast ROS: negative for breast lumps  Respiratory ROS: no cough, shortness of breath, or wheezing  Cardiovascular ROS: no chest pain or dyspnea on exertion  Gastrointestinal ROS: no abdominal pain, change in bowel habits, or black or bloody stools  Genito-Urinary ROS: no dysuria, trouble voiding, or hematuria  Musculoskeletal ROS: negative  Neurological ROS: no TIA or stroke symptoms  Dermatological ROS: negative          Physical Exam  Vitals reviewed  Constitutional:       Appearance: She is well-developed  Neck:      Thyroid: No thyromegaly  Cardiovascular:      Rate and Rhythm: Normal rate  Heart sounds: Normal heart sounds  Pulmonary:      Effort: Pulmonary effort is normal  No accessory muscle usage or respiratory distress  Chest:      Chest wall: No tenderness  Breasts:     Breasts are symmetrical       Right: No inverted nipple, mass or tenderness  Left: No inverted nipple, mass or tenderness  Abdominal:      General: There is no distension  Palpations: Abdomen is soft  Tenderness: There is no abdominal tenderness  There is no guarding or rebound  Genitourinary:     Exam position: Supine  Labia:         Right: No rash, tenderness, lesion or injury  Left: No rash, tenderness, lesion or injury  Vagina: Normal  No foreign body  No vaginal discharge or bleeding  Cervix: No cervical motion tenderness or discharge  Uterus: Not enlarged and not fixed  Adnexa:         Right: No mass, tenderness or fullness  Left: No mass, tenderness or fullness  Rectum: No external hemorrhoid  Musculoskeletal:      Cervical back: Normal range of motion  Lymphadenopathy:      Cervical: No cervical adenopathy  Upper Body:      Right upper body: No supraclavicular adenopathy  Left upper body: No supraclavicular adenopathy  Neurological:      Mental Status: She is alert and oriented to person, place, and time  Psychiatric:         Speech: Speech normal          Behavior: Behavior normal          Thought Content:  Thought content normal          Judgment: Judgment normal

## 2023-03-08 ENCOUNTER — OFFICE VISIT (OUTPATIENT)
Dept: OBGYN CLINIC | Facility: MEDICAL CENTER | Age: 53
End: 2023-03-08

## 2023-03-08 VITALS — WEIGHT: 234.8 LBS | BODY MASS INDEX: 43.21 KG/M2 | HEIGHT: 62 IN

## 2023-03-08 DIAGNOSIS — Z12.4 SCREENING FOR CERVICAL CANCER: ICD-10-CM

## 2023-03-08 DIAGNOSIS — Z12.31 SCREENING MAMMOGRAM FOR HIGH-RISK PATIENT: ICD-10-CM

## 2023-03-08 DIAGNOSIS — Z01.419 WOMEN'S ANNUAL ROUTINE GYNECOLOGICAL EXAMINATION: Primary | ICD-10-CM

## 2023-03-09 ENCOUNTER — OFFICE VISIT (OUTPATIENT)
Dept: PHYSICAL THERAPY | Facility: MEDICAL CENTER | Age: 53
End: 2023-03-09

## 2023-03-09 DIAGNOSIS — G89.29 CHRONIC MIDLINE LOW BACK PAIN, UNSPECIFIED WHETHER SCIATICA PRESENT: Primary | ICD-10-CM

## 2023-03-09 DIAGNOSIS — M54.50 CHRONIC MIDLINE LOW BACK PAIN, UNSPECIFIED WHETHER SCIATICA PRESENT: Primary | ICD-10-CM

## 2023-03-09 NOTE — PROGRESS NOTES
Daily Note     Today's date: 3/9/2023  Patient name: Juan Carlos Quijano  : 1970  MRN: [id number]  Referring provider: Gregory Winters DO  Dx:   Encounter Diagnosis     ICD-10-CM    1  Chronic midline low back pain, unspecified whether sciatica present  M54 50     G89 29                      Subjective: patient states that has been having the same lower left back pain and also some knee pain on the left  Objective: See treatment diary below      Assessment: Tolerated treatment well  Patient demonstrated fatigue post treatment, exhibited good technique with therapeutic exercises and would benefit from continued PT  Patient seemed to be over stretching  Trial of traction was done today  Plan: Continue per plan of care  Precautions: TOS surgery years ago      Daily Treatment Diary     Manual              UPA left Gr   Iv- 10sec x5            Percussion STM 5 min                                                       Exercise Diary              Hip flexor str 10sec x5            Hamstring str 10sec x5            Piriformis str 10sec x5            LTR 10sec x5            SKTC 10sec x5            SLR 10x            Bridging with ball between knees 10x            Clamshell with band 10x            Standing hip abduction 10            Standing hip extension 10            Stability chest press 15x2                         eliptical  7min                                                                                                           Modalities              Mechanical traction 10min 100lbs 30/10

## 2023-03-10 LAB
HPV HR 12 DNA CVX QL NAA+PROBE: NEGATIVE
HPV16 DNA CVX QL NAA+PROBE: NEGATIVE
HPV18 DNA CVX QL NAA+PROBE: NEGATIVE

## 2023-03-14 ENCOUNTER — OFFICE VISIT (OUTPATIENT)
Dept: PHYSICAL THERAPY | Facility: MEDICAL CENTER | Age: 53
End: 2023-03-14

## 2023-03-14 DIAGNOSIS — M54.50 CHRONIC MIDLINE LOW BACK PAIN, UNSPECIFIED WHETHER SCIATICA PRESENT: Primary | ICD-10-CM

## 2023-03-14 DIAGNOSIS — G89.29 CHRONIC MIDLINE LOW BACK PAIN, UNSPECIFIED WHETHER SCIATICA PRESENT: Primary | ICD-10-CM

## 2023-03-14 NOTE — PROGRESS NOTES
Daily Note     Today's date: 3/14/2023  Patient name: Kuldip Shah  : 1970  MRN: [id number]  Referring provider: Consuelo Aguilera DO  Dx:   Encounter Diagnosis     ICD-10-CM    1  Chronic midline low back pain, unspecified whether sciatica present  M54 50     G89 29                      Subjective: patient states that has been having the same lower left back pain and also some knee pain on the left  Objective: See treatment diary below      Assessment: Tolerated treatment well  Patient demonstrated fatigue post treatment, exhibited good technique with therapeutic exercises and would benefit from continued PT  Patient seemed to be over stretching  Traction improved symptoms  Plan: Continue per plan of care  Precautions: TOS surgery years ago      Daily Treatment Diary     Manual              UPA left Gr   Iv- 10sec x5            Percussion STM 5 min                                                       Exercise Diary              Hip flexor str 10sec x5            Hamstring str 10sec x5            Piriformis str 10sec x5            LTR 10sec x5            SKTC 10sec x5            SLR 10x            Bridging with ball between knees 10x            Clamshell with band 10x            Standing hip abduction 10            Standing hip extension 10            Stability chest press 15x2                         eliptical  7min                                                                                                           Modalities              Mechanical traction 10min 100lbs 30/10

## 2023-03-15 LAB
LAB AP GYN PRIMARY INTERPRETATION: NORMAL
Lab: NORMAL

## 2023-03-16 ENCOUNTER — OFFICE VISIT (OUTPATIENT)
Dept: PHYSICAL THERAPY | Facility: MEDICAL CENTER | Age: 53
End: 2023-03-16

## 2023-03-16 DIAGNOSIS — M54.50 CHRONIC MIDLINE LOW BACK PAIN, UNSPECIFIED WHETHER SCIATICA PRESENT: Primary | ICD-10-CM

## 2023-03-16 DIAGNOSIS — G89.29 CHRONIC MIDLINE LOW BACK PAIN, UNSPECIFIED WHETHER SCIATICA PRESENT: Primary | ICD-10-CM

## 2023-03-16 NOTE — PROGRESS NOTES
Daily Note     Today's date: 3/16/2023  Patient name: Marthena Heimlich  : 1970  MRN: [id number]  Referring provider: Derwood Alpers, DO  Dx:   Encounter Diagnosis     ICD-10-CM    1  Chronic midline low back pain, unspecified whether sciatica present  M54 50     G89 29                      Subjective: patient states that she was feeling better after last session and was hoping to feel good again  Objective: See treatment diary below      Assessment: Tolerated treatment well  Patient demonstrated fatigue post treatment, exhibited good technique with therapeutic exercises and would benefit from continued PT  Patient seemed to be over stretching  Traction improved symptoms  Plan: Continue per plan of care  Precautions: TOS surgery years ago      Daily Treatment Diary     Manual              UPA left Gr   Iv- 10sec x5            Percussion STM 5 min                                                       Exercise Diary              Hip flexor str 10sec x5            Hamstring str 10sec x5            Piriformis str 10sec x5            LTR 10sec x5            SKTC 10sec x5            SLR 10x            Bridging with ball between knees 10x            Clamshell with band 10x            Standing hip abduction 10            Standing hip extension 10            Stability chest press 15x2                         eliptical  7min                                                                                                           Modalities              Mechanical traction 10min 100lbs 30/10

## 2023-03-21 ENCOUNTER — APPOINTMENT (OUTPATIENT)
Dept: PHYSICAL THERAPY | Facility: MEDICAL CENTER | Age: 53
End: 2023-03-21

## 2023-03-23 ENCOUNTER — APPOINTMENT (OUTPATIENT)
Dept: PHYSICAL THERAPY | Facility: MEDICAL CENTER | Age: 53
End: 2023-03-23

## 2023-03-28 ENCOUNTER — OFFICE VISIT (OUTPATIENT)
Dept: PHYSICAL THERAPY | Facility: MEDICAL CENTER | Age: 53
End: 2023-03-28

## 2023-03-28 DIAGNOSIS — M54.50 CHRONIC MIDLINE LOW BACK PAIN, UNSPECIFIED WHETHER SCIATICA PRESENT: Primary | ICD-10-CM

## 2023-03-28 DIAGNOSIS — G89.29 CHRONIC MIDLINE LOW BACK PAIN, UNSPECIFIED WHETHER SCIATICA PRESENT: Primary | ICD-10-CM

## 2023-03-28 NOTE — PROGRESS NOTES
Daily Note     Today's date: 3/28/2023  Patient name: Jamaal Lockhart  : 1970  MRN: [id number]  Referring provider: Castro Khanna DO  Dx:   Encounter Diagnosis     ICD-10-CM    1  Chronic midline low back pain, unspecified whether sciatica present  M54 50     G89 29                      Subjective: patient states that she was feeling better after last session and was able to do her work with much less pain last week  Objective: See treatment diary below      Assessment: Tolerated treatment well  Patient demonstrated fatigue post treatment, exhibited good technique with therapeutic exercises and would benefit from continued PT  Patient seemed to be over stretching  Traction improved symptoms  Plan: Continue per plan of care  Precautions: TOS surgery years ago      Daily Treatment Diary     Manual              UPA left Gr   Iv- 10sec x5            Percussion STM 5 min                                                       Exercise Diary              Hip flexor str 10sec x5            Hamstring str 10sec x5            Piriformis str 10sec x5            LTR 10sec x5            SKTC 10sec x5            SLR 10x            Bridging with ball between knees 10x            Clamshell with band 10x            Standing hip abduction 10            Standing hip extension 10            Stability chest press 15x2                         eliptical  7min                                                                                                           Modalities              Mechanical traction 10min 100lbs 30/10

## 2023-03-30 ENCOUNTER — OFFICE VISIT (OUTPATIENT)
Dept: PHYSICAL THERAPY | Facility: MEDICAL CENTER | Age: 53
End: 2023-03-30

## 2023-03-30 DIAGNOSIS — M54.50 CHRONIC MIDLINE LOW BACK PAIN, UNSPECIFIED WHETHER SCIATICA PRESENT: Primary | ICD-10-CM

## 2023-03-30 DIAGNOSIS — G89.29 CHRONIC MIDLINE LOW BACK PAIN, UNSPECIFIED WHETHER SCIATICA PRESENT: Primary | ICD-10-CM

## 2023-03-30 NOTE — PROGRESS NOTES
Daily Note     Today's date: 3/30/2023  Patient name: Arun Guan  : 1970  MRN: [id number]  Referring provider: Hal Butler DO  Dx:   Encounter Diagnosis     ICD-10-CM    1  Chronic midline low back pain, unspecified whether sciatica present  M54 50     G89 29                      Subjective: patient states that she was feeling pretty good today     Objective: See treatment diary below      Assessment: Tolerated treatment well  Patient demonstrated fatigue post treatment, exhibited good technique with therapeutic exercises and would benefit from continued PT  Patient seemed to be over stretching  Traction improved symptoms  Plan: Continue per plan of care  Precautions: TOS surgery years ago      Daily Treatment Diary     Manual              UPA left Gr   Iv- 10sec x5            Percussion STM 5 min                                                       Exercise Diary              Hip flexor str 10sec x5            Hamstring str 10sec x5            Piriformis str 10sec x5            LTR 10sec x5            SKTC 10sec x5            SLR 10x            Bridging with ball between knees 10x            Clamshell with band 10x            Standing hip abduction 10            Standing hip extension 10            Stability chest press 15x2                         eliptical  7min                                                                                                           Modalities              Mechanical traction 10min 100lbs 30/10

## 2023-04-05 ENCOUNTER — OFFICE VISIT (OUTPATIENT)
Dept: PHYSICAL THERAPY | Facility: MEDICAL CENTER | Age: 53
End: 2023-04-05

## 2023-04-05 DIAGNOSIS — G89.29 CHRONIC MIDLINE LOW BACK PAIN, UNSPECIFIED WHETHER SCIATICA PRESENT: Primary | ICD-10-CM

## 2023-04-05 DIAGNOSIS — M54.50 CHRONIC MIDLINE LOW BACK PAIN, UNSPECIFIED WHETHER SCIATICA PRESENT: Primary | ICD-10-CM

## 2023-04-05 NOTE — PROGRESS NOTES
Daily Note     Today's date: 2023  Patient name: Alexis Chandler  : 1970  MRN: [id number]  Referring provider: Penny Cosby DO  Dx:   Encounter Diagnosis     ICD-10-CM    1  Chronic midline low back pain, unspecified whether sciatica present  M54 50     G89 29                      Subjective: patient states that she walked a lot while at a [employer] race  She reports other than the soreness she feels pretty good  Objective: See treatment diary below      Assessment: Tolerated treatment well  Patient demonstrated fatigue post treatment, exhibited good technique with therapeutic exercises and would benefit from continued PT  Patient demonstrates adherence to HEP  Patient vocalized tightness in her back during stretches  This improved after manual interventions and traction  Plan: Continue per plan of care  Precautions: TOS surgery years ago      Daily Treatment Diary     Manual              UPA left Gr   Iv- 10sec x5            Percussion STM 5 min                                                       Exercise Diary              Hip flexor str 10sec x5            Hamstring str 10sec x5            Piriformis str 10sec x5            LTR 10sec x5            SKTC 10sec x5            SLR 10x            Bridging with ball between knees 10x            Clamshell with band 10x            Standing hip abduction 10            Standing hip extension 10            Stability chest press 15x2                         eliptical  7min                                                                                                           Modalities              Mechanical traction 10min 100lbs 30/10

## 2023-04-07 ENCOUNTER — APPOINTMENT (OUTPATIENT)
Dept: PHYSICAL THERAPY | Facility: MEDICAL CENTER | Age: 53
End: 2023-04-07

## 2023-04-20 ENCOUNTER — APPOINTMENT (OUTPATIENT)
Dept: PHYSICAL THERAPY | Facility: MEDICAL CENTER | Age: 53
End: 2023-04-20

## 2023-04-24 ENCOUNTER — OFFICE VISIT (OUTPATIENT)
Dept: PHYSICAL THERAPY | Facility: MEDICAL CENTER | Age: 53
End: 2023-04-24

## 2023-04-24 DIAGNOSIS — M25.562 ACUTE PAIN OF LEFT KNEE: Primary | ICD-10-CM

## 2023-04-24 NOTE — PROGRESS NOTES
Daily Note     Today's date: 2023  Patient name: Arun Guan  : 1970  MRN: [id number]  Referring provider: Hal Butler DO  Dx:   Encounter Diagnosis     ICD-10-CM    1  Acute pain of left knee  M25 562                      Subjective: patient states that she was feeling good overall however one day of severe pain       Objective: See treatment diary below      Assessment: Tolerated treatment well  Patient demonstrated fatigue post treatment, exhibited good technique with therapeutic exercises and would benefit from continued PT  Focus today on eccentric hamstring control and manual stretching of the left LE and knee  Plan: Continue per plan of care        Precautions: none        Daily Treatment Diary     Manual              Left knee mobilization 15min                                                                    Exercise Diary                           Hamstring str hold                                                                Bridging with ball between knees hold            Clamshell with band 30x            Side lying hip abduction 30x            Prone hip extension 30x            hamstring eccentrics 30x                         Bike                                                                                                            Modalities                           k tape x

## 2023-04-25 ENCOUNTER — APPOINTMENT (OUTPATIENT)
Dept: PHYSICAL THERAPY | Facility: MEDICAL CENTER | Age: 53
End: 2023-04-25

## 2023-04-27 ENCOUNTER — APPOINTMENT (OUTPATIENT)
Dept: PHYSICAL THERAPY | Facility: MEDICAL CENTER | Age: 53
End: 2023-04-27

## 2023-04-28 ENCOUNTER — OFFICE VISIT (OUTPATIENT)
Dept: PHYSICAL THERAPY | Facility: MEDICAL CENTER | Age: 53
End: 2023-04-28

## 2023-04-28 DIAGNOSIS — G89.29 CHRONIC MIDLINE LOW BACK PAIN, UNSPECIFIED WHETHER SCIATICA PRESENT: ICD-10-CM

## 2023-04-28 DIAGNOSIS — M25.562 ACUTE PAIN OF LEFT KNEE: Primary | ICD-10-CM

## 2023-04-28 DIAGNOSIS — M54.50 CHRONIC MIDLINE LOW BACK PAIN, UNSPECIFIED WHETHER SCIATICA PRESENT: ICD-10-CM

## 2023-04-28 NOTE — PROGRESS NOTES
Daily Note     Today's date: 2023  Patient name: Leanna Sanchez  : 1970  MRN: [id number]  Referring provider: Moustapha Sanchez DO  Dx:   Encounter Diagnosis     ICD-10-CM    1  Acute pain of left knee  M25 562       2  Chronic midline low back pain, unspecified whether sciatica present  M54 50     G89 29                      Subjective: patient states that her knee is feeling much worse  She has had an increase in swelling and more pain  She has not been doing her exercises over the past week because of her work schedule  Objective: See treatment diary below      Assessment: fairly dramatic increase in left knee edema  Joint line pain with palpation and limitations in ambulation  Due to the worsening symptoms of left knee pain a referral has been made to ortho sports med  Patient will be seeing Dr Zaire Ferguson at [facility]  She will follow up after that appointment  Plan: Continue per plan of care        Precautions: none

## 2023-05-04 ENCOUNTER — APPOINTMENT (OUTPATIENT)
Dept: PHYSICAL THERAPY | Facility: MEDICAL CENTER | Age: 53
End: 2023-05-04
Payer: COMMERCIAL

## 2023-05-08 ENCOUNTER — APPOINTMENT (OUTPATIENT)
Dept: PHYSICAL THERAPY | Facility: MEDICAL CENTER | Age: 53
End: 2023-05-08
Payer: COMMERCIAL

## 2023-05-09 ENCOUNTER — APPOINTMENT (OUTPATIENT)
Dept: PHYSICAL THERAPY | Facility: MEDICAL CENTER | Age: 53
End: 2023-05-09
Payer: COMMERCIAL

## 2023-05-10 ENCOUNTER — APPOINTMENT (OUTPATIENT)
Dept: PHYSICAL THERAPY | Facility: MEDICAL CENTER | Age: 53
End: 2023-05-10
Payer: COMMERCIAL

## 2023-05-15 ENCOUNTER — APPOINTMENT (OUTPATIENT)
Dept: PHYSICAL THERAPY | Facility: MEDICAL CENTER | Age: 53
End: 2023-05-15
Payer: COMMERCIAL

## 2023-05-16 ENCOUNTER — OFFICE VISIT (OUTPATIENT)
Dept: PHYSICAL THERAPY | Facility: MEDICAL CENTER | Age: 53
End: 2023-05-16

## 2023-05-16 DIAGNOSIS — G89.29 CHRONIC MIDLINE LOW BACK PAIN, UNSPECIFIED WHETHER SCIATICA PRESENT: ICD-10-CM

## 2023-05-16 DIAGNOSIS — M25.562 ACUTE PAIN OF LEFT KNEE: Primary | ICD-10-CM

## 2023-05-16 DIAGNOSIS — M54.50 CHRONIC MIDLINE LOW BACK PAIN, UNSPECIFIED WHETHER SCIATICA PRESENT: ICD-10-CM

## 2023-05-16 NOTE — PROGRESS NOTES
Daily Note     Today's date: 2023  Patient name: Aris Rodrigues  : 1970  MRN: [id number]  Referring provider: Tomasa Sharpe DO  Dx:   Encounter Diagnosis     ICD-10-CM    1  Acute pain of left knee  M25 562       2  Chronic midline low back pain, unspecified whether sciatica present  M54 50     G89 29                      Subjective: patient states that the left knee is improved since she saw the physician  She is wearing the double upright brace which helps however her pain levels continue to be high  Objective: See treatment diary below      Assessment: patient was started on light hip and pelvic stabilization exercises as well as quad and hamstring activation  Patient was progressed with ROM and functional mobility as much as tolerated  Plan: Continue per plan of care        Precautions: none          Manuals             Left knee ROM 10min                                                                                                                                                           Ther Ex             Heel slides 10x             Quad sets 10x            Gluteal sets 10x            clamshells 10x            Ankle pumps 10x            Knee pendulums 10x2                                                                                                                     Modalities             ice 10min

## 2023-05-18 ENCOUNTER — OFFICE VISIT (OUTPATIENT)
Dept: PHYSICAL THERAPY | Facility: MEDICAL CENTER | Age: 53
End: 2023-05-18

## 2023-05-18 DIAGNOSIS — M54.50 CHRONIC MIDLINE LOW BACK PAIN, UNSPECIFIED WHETHER SCIATICA PRESENT: ICD-10-CM

## 2023-05-18 DIAGNOSIS — M25.562 ACUTE PAIN OF LEFT KNEE: Primary | ICD-10-CM

## 2023-05-18 DIAGNOSIS — G89.29 CHRONIC MIDLINE LOW BACK PAIN, UNSPECIFIED WHETHER SCIATICA PRESENT: ICD-10-CM

## 2023-05-18 NOTE — PROGRESS NOTES
Daily Note     Today's date: 2023  Patient name: Kailyn Reed  : 1970  MRN: [id number]  Referring provider: Dimitri Tran DO  Dx:   Encounter Diagnosis     ICD-10-CM    1  Acute pain of left knee  M25 562       2  Chronic midline low back pain, unspecified whether sciatica present  M54 50     G89 29                      Subjective: patient states that the left knee is improved since last visit  She is working on her program at home and getting more flexion and less difficulty with walking  Objective: See treatment diary below      Assessment: patient was started on light hip and pelvic stabilization exercises as well as quad and hamstring activation  Patient was progressed with ROM and functional mobility as much as tolerated  Making good gains from last session in ROM and tolerance to ambulation  Plan: Continue per plan of care        Precautions: none          Manuals             Left knee ROM 10min                                                                                                                                                           Ther Ex             Heel slides 10x             Quad sets 10x            Gluteal sets 10x            clamshells 10x            Ankle pumps 10x            Knee pendulums 10x2                                                                                                                     Modalities             ice 10min

## 2023-05-23 ENCOUNTER — OFFICE VISIT (OUTPATIENT)
Dept: PHYSICAL THERAPY | Facility: MEDICAL CENTER | Age: 53
End: 2023-05-23

## 2023-05-23 ENCOUNTER — VBI (OUTPATIENT)
Dept: ADMINISTRATIVE | Facility: OTHER | Age: 53
End: 2023-05-23

## 2023-05-23 DIAGNOSIS — M54.50 CHRONIC MIDLINE LOW BACK PAIN, UNSPECIFIED WHETHER SCIATICA PRESENT: ICD-10-CM

## 2023-05-23 DIAGNOSIS — G89.29 CHRONIC MIDLINE LOW BACK PAIN, UNSPECIFIED WHETHER SCIATICA PRESENT: ICD-10-CM

## 2023-05-23 DIAGNOSIS — M25.562 ACUTE PAIN OF LEFT KNEE: Primary | ICD-10-CM

## 2023-05-23 NOTE — PROGRESS NOTES
Daily Note     Today's date: 2023  Patient name: Yumiko Cedeno  : 1970  MRN: [id number]  Referring provider: Andre Phan DO  Dx:   Encounter Diagnosis     ICD-10-CM    1  Acute pain of left knee  M25 562       2  Chronic midline low back pain, unspecified whether sciatica present  M54 50     G89 29                      Subjective: patient states that the left knee is improved since last visit  She is working on her program at home and getting more flexion and less difficulty with walking  Had injection yesterday of Euflexxa       Objective: See treatment diary below      Assessment: patient was started on light hip and pelvic stabilization exercises as well as quad and hamstring activation  Patient was progressed with ROM and functional mobility as much as tolerated  Making good gains from last session in ROM and tolerance to ambulation  Plan: Continue per plan of care        Precautions: none          Manuals             Left knee ROM 10min                                                                                                                                                           Ther Ex             Heel slides 10x             Quad sets 10x            Gluteal sets 10x            clamshells 10x            Ankle pumps 10x            Knee pendulums 10x2                                                                                                                     Modalities             ice 10min

## 2023-05-25 ENCOUNTER — OFFICE VISIT (OUTPATIENT)
Dept: PHYSICAL THERAPY | Facility: MEDICAL CENTER | Age: 53
End: 2023-05-25

## 2023-05-25 DIAGNOSIS — M54.50 CHRONIC MIDLINE LOW BACK PAIN, UNSPECIFIED WHETHER SCIATICA PRESENT: ICD-10-CM

## 2023-05-25 DIAGNOSIS — G89.29 CHRONIC MIDLINE LOW BACK PAIN, UNSPECIFIED WHETHER SCIATICA PRESENT: ICD-10-CM

## 2023-05-25 DIAGNOSIS — M25.562 ACUTE PAIN OF LEFT KNEE: Primary | ICD-10-CM

## 2023-05-25 NOTE — PROGRESS NOTES
Daily Note     Today's date: 2023  Patient name: Sandra Rosario  : 1970  MRN: [id number]  Referring provider: Janice Mccann DO  Dx:   Encounter Diagnosis     ICD-10-CM    1  Acute pain of left knee  M25 562       2  Chronic midline low back pain, unspecified whether sciatica present  M54 50     G89 29                      Subjective: patient states that the left knee is improved since last visit  She is working on her program at home and getting more flexion and less difficulty with walking  Objective: See treatment diary below      Assessment: patient was started on light hip and pelvic stabilization exercises as well as quad and hamstring activation  Patient was progressed with ROM and functional mobility as much as tolerated  Making good gains from last session in ROM and tolerance to ambulation  Plan: Continue per plan of care        Precautions: none          Manuals             Left knee ROM 10min                                                                                                                                                           Ther Ex             Heel slides 10x             Quad sets 10x            Gluteal sets 10x            clamshells 10x            Ankle pumps 10x            Knee pendulums 10x2                                                                                                                     Modalities             ice

## 2023-05-30 ENCOUNTER — OFFICE VISIT (OUTPATIENT)
Dept: PHYSICAL THERAPY | Facility: MEDICAL CENTER | Age: 53
End: 2023-05-30

## 2023-05-30 DIAGNOSIS — G89.29 CHRONIC MIDLINE LOW BACK PAIN, UNSPECIFIED WHETHER SCIATICA PRESENT: ICD-10-CM

## 2023-05-30 DIAGNOSIS — M25.562 ACUTE PAIN OF LEFT KNEE: Primary | ICD-10-CM

## 2023-05-30 DIAGNOSIS — M54.50 CHRONIC MIDLINE LOW BACK PAIN, UNSPECIFIED WHETHER SCIATICA PRESENT: ICD-10-CM

## 2023-05-30 NOTE — PROGRESS NOTES
Daily Note     Today's date: 2023  Patient name: Deni Valencia  : 1970  MRN: [id number]  Referring provider: Lilia Gonzalez DO  Dx:   Encounter Diagnosis     ICD-10-CM    1  Acute pain of left knee  M25 562       2  Chronic midline low back pain, unspecified whether sciatica present  M54 50     G89 29                      Subjective: patient states that the left knee is improved since last visit  Objective: See treatment diary below      Assessment: patient was started on light hip and pelvic stabilization exercises as well as quad and hamstring activation  Patient was progressed with ROM and functional mobility as much as tolerated  Making good gains from last session in ROM and tolerance to ambulation  Plan: Continue per plan of care        Precautions: none          Manuals             Left knee ROM 10min                                                                                                                                                           Ther Ex             Heel slides 10x             Quad sets 10x            Gluteal sets 10x            clamshells 10x            Ankle pumps 10x            Knee pendulums 10x2                                                                                                                     Modalities             ice

## 2023-06-02 ENCOUNTER — APPOINTMENT (OUTPATIENT)
Dept: PHYSICAL THERAPY | Facility: MEDICAL CENTER | Age: 53
End: 2023-06-02
Payer: COMMERCIAL

## 2023-06-06 ENCOUNTER — OFFICE VISIT (OUTPATIENT)
Dept: PHYSICAL THERAPY | Facility: MEDICAL CENTER | Age: 53
End: 2023-06-06
Payer: COMMERCIAL

## 2023-06-06 DIAGNOSIS — M54.50 CHRONIC MIDLINE LOW BACK PAIN, UNSPECIFIED WHETHER SCIATICA PRESENT: ICD-10-CM

## 2023-06-06 DIAGNOSIS — G89.29 CHRONIC MIDLINE LOW BACK PAIN, UNSPECIFIED WHETHER SCIATICA PRESENT: ICD-10-CM

## 2023-06-06 DIAGNOSIS — M25.562 ACUTE PAIN OF LEFT KNEE: Primary | ICD-10-CM

## 2023-06-06 PROCEDURE — 97140 MANUAL THERAPY 1/> REGIONS: CPT | Performed by: PHYSICAL THERAPIST

## 2023-06-06 PROCEDURE — 97110 THERAPEUTIC EXERCISES: CPT | Performed by: PHYSICAL THERAPIST

## 2023-06-06 NOTE — PROGRESS NOTES
Daily Note     Today's date: 2023  Patient name: Jolene Quintana  : 1970  MRN: [id number]  Referring provider: Salazar Uriarte DO  Dx:   Encounter Diagnosis     ICD-10-CM    1  Acute pain of left knee  M25 562       2  Chronic midline low back pain, unspecified whether sciatica present  M54 50     G89 29                      Subjective: patient states that the left knee is a bit more irritated  May be from stopping the advil but also may be just a bit more irritated from being active  Objective: See treatment diary below      Assessment: patient was started on light hip and pelvic stabilization exercises as well as quad and hamstring activation  Patient was progressed with ROM and functional mobility as much as tolerated  Making good gains from last session in ROM and tolerance to ambulation  Plan: Continue per plan of care        Precautions: none          Manuals             Left knee ROM 10min                                                                                                                                                           Ther Ex             Heel slides             Quad sets 10x            Gluteal sets 10x            clamshells 10x            Ankle pumps 10x            Knee pendulums 10x2             Wall squats 20x            LAQ 10x3 5lbs            Standing hip abduction 30x                                                                             Modalities             ice

## 2023-06-08 ENCOUNTER — OFFICE VISIT (OUTPATIENT)
Dept: PHYSICAL THERAPY | Facility: MEDICAL CENTER | Age: 53
End: 2023-06-08
Payer: COMMERCIAL

## 2023-06-08 DIAGNOSIS — G89.29 CHRONIC MIDLINE LOW BACK PAIN, UNSPECIFIED WHETHER SCIATICA PRESENT: ICD-10-CM

## 2023-06-08 DIAGNOSIS — M25.562 ACUTE PAIN OF LEFT KNEE: Primary | ICD-10-CM

## 2023-06-08 DIAGNOSIS — M54.50 CHRONIC MIDLINE LOW BACK PAIN, UNSPECIFIED WHETHER SCIATICA PRESENT: ICD-10-CM

## 2023-06-08 PROCEDURE — 97110 THERAPEUTIC EXERCISES: CPT | Performed by: PHYSICAL THERAPIST

## 2023-06-08 PROCEDURE — 97140 MANUAL THERAPY 1/> REGIONS: CPT | Performed by: PHYSICAL THERAPIST

## 2023-06-08 NOTE — PROGRESS NOTES
Daily Note     Today's date: 2023  Patient name: Krystal Villalobos  : 1970  MRN: [id number]  Referring provider: Jm Beck DO  Dx:   Encounter Diagnosis     ICD-10-CM    1  Acute pain of left knee  M25 562       2  Chronic midline low back pain, unspecified whether sciatica present  M54 50     G89 29                      Subjective: patient states that the left knee is improved but continues to be painful  Objective: See treatment diary below      Assessment: patient was started on light hip and pelvic stabilization exercises as well as quad and hamstring activation  Patient was progressed with ROM and functional mobility as much as tolerated  Making good gains from last session in ROM and tolerance to ambulation  Suggested wearing the knee brace again to see if that will help the symptoms in the left knee  Plan: Continue per plan of care        Precautions: none          Manuals             Left knee ROM 10min                                                                                                                                                           Ther Ex             Heel slides             Quad sets 10x2            Gluteal sets 10x2            clamshells 10x2            Ankle pumps 10x2            Knee pendulums 10x2             Wall squats 20x            LAQ 10x3 5lbs            Standing hip abduction 30x                                                                             Modalities             ice

## 2023-06-13 ENCOUNTER — OFFICE VISIT (OUTPATIENT)
Dept: PHYSICAL THERAPY | Facility: MEDICAL CENTER | Age: 53
End: 2023-06-13
Payer: COMMERCIAL

## 2023-06-13 DIAGNOSIS — M54.50 CHRONIC MIDLINE LOW BACK PAIN, UNSPECIFIED WHETHER SCIATICA PRESENT: ICD-10-CM

## 2023-06-13 DIAGNOSIS — M25.562 ACUTE PAIN OF LEFT KNEE: Primary | ICD-10-CM

## 2023-06-13 DIAGNOSIS — G89.29 CHRONIC MIDLINE LOW BACK PAIN, UNSPECIFIED WHETHER SCIATICA PRESENT: ICD-10-CM

## 2023-06-13 PROCEDURE — 97140 MANUAL THERAPY 1/> REGIONS: CPT | Performed by: PHYSICAL THERAPIST

## 2023-06-13 PROCEDURE — 97110 THERAPEUTIC EXERCISES: CPT | Performed by: PHYSICAL THERAPIST

## 2023-06-13 NOTE — PROGRESS NOTES
Daily Note     Today's date: 2023  Patient name: Emi Holstein  : 1970  MRN: [id number]  Referring provider: Heriberto Eid DO  Dx:   Encounter Diagnosis     ICD-10-CM    1  Acute pain of left knee  M25 562       2  Chronic midline low back pain, unspecified whether sciatica present  M54 50     G89 29                      Subjective: patient states that the left knee is improved but continues to be painful but a bit more support with the brace on  Objective: See treatment diary below      Assessment: patient was started on light hip and pelvic stabilization exercises as well as quad and hamstring activation  Patient was progressed with ROM and functional mobility as much as tolerated  Making good gains from last session in ROM and tolerance to ambulation  Patient has been wearing the brace as suggested  Plan: Continue per plan of care        Precautions: none          Manuals             Left knee ROM 10min                                                                                                                                                           Ther Ex             Heel slides             Quad sets 10x2            Gluteal sets 10x2            clamshells 10x2            Ankle pumps 10x2            Knee pendulums 10x2             Wall squats 20x            LAQ 10x3 5lbs            Standing hip abduction 30x                                                                             Modalities             ice

## 2023-06-27 ENCOUNTER — APPOINTMENT (OUTPATIENT)
Dept: PHYSICAL THERAPY | Facility: MEDICAL CENTER | Age: 53
End: 2023-06-27
Payer: COMMERCIAL

## 2023-06-29 ENCOUNTER — OFFICE VISIT (OUTPATIENT)
Dept: PHYSICAL THERAPY | Facility: MEDICAL CENTER | Age: 53
End: 2023-06-29
Payer: COMMERCIAL

## 2023-06-29 DIAGNOSIS — G89.29 CHRONIC MIDLINE LOW BACK PAIN, UNSPECIFIED WHETHER SCIATICA PRESENT: ICD-10-CM

## 2023-06-29 DIAGNOSIS — M25.562 ACUTE PAIN OF LEFT KNEE: Primary | ICD-10-CM

## 2023-06-29 DIAGNOSIS — M54.50 CHRONIC MIDLINE LOW BACK PAIN, UNSPECIFIED WHETHER SCIATICA PRESENT: ICD-10-CM

## 2023-06-29 PROCEDURE — 97140 MANUAL THERAPY 1/> REGIONS: CPT | Performed by: PHYSICAL THERAPIST

## 2023-06-29 PROCEDURE — 97110 THERAPEUTIC EXERCISES: CPT | Performed by: PHYSICAL THERAPIST

## 2023-06-29 PROCEDURE — 97112 NEUROMUSCULAR REEDUCATION: CPT | Performed by: PHYSICAL THERAPIST

## 2023-06-29 NOTE — PROGRESS NOTES
Daily Note     Today's date: 2023  Patient name: Sha Rothman  : 1970  MRN: [id number]  Referring provider: Michael Yang DO  Dx:   Encounter Diagnosis     ICD-10-CM    1  Acute pain of left knee  M25 562       2  Chronic midline low back pain, unspecified whether sciatica present  M54 50     G89 29                      Subjective: patient states that the left knee is painful and so is her back today  She has been having a fair time with her knee but the back has been more irritated lately  Objective: See treatment diary below      Assessment: patient was progressed with manual therapy and general exercise which she tolerated well  She was improved following treatment  Plan: Continue per plan of care        Precautions: none          Manuals             Left knee ROM 10min                                                                                                                                                           Ther Ex             Heel slides             Quad sets 10x2            Gluteal sets 10x2            clamshells 10x2            Ankle pumps 10x2            Knee pendulums 10x2             Wall squats 20x            LAQ 10x3 5lbs            Standing hip abduction 30x                                                                             Modalities             ice

## 2023-07-03 ENCOUNTER — OFFICE VISIT (OUTPATIENT)
Dept: PHYSICAL THERAPY | Facility: MEDICAL CENTER | Age: 53
End: 2023-07-03
Payer: COMMERCIAL

## 2023-07-03 DIAGNOSIS — M54.50 CHRONIC MIDLINE LOW BACK PAIN, UNSPECIFIED WHETHER SCIATICA PRESENT: ICD-10-CM

## 2023-07-03 DIAGNOSIS — G89.29 CHRONIC MIDLINE LOW BACK PAIN, UNSPECIFIED WHETHER SCIATICA PRESENT: ICD-10-CM

## 2023-07-03 DIAGNOSIS — M25.562 ACUTE PAIN OF LEFT KNEE: Primary | ICD-10-CM

## 2023-07-03 PROCEDURE — 97110 THERAPEUTIC EXERCISES: CPT | Performed by: PHYSICAL THERAPIST

## 2023-07-03 PROCEDURE — 97140 MANUAL THERAPY 1/> REGIONS: CPT | Performed by: PHYSICAL THERAPIST

## 2023-07-03 NOTE — PROGRESS NOTES
Daily Note     Today's date: 7/3/2023  Patient name: Pamella Luke  : 1970  MRN: [id number]  Referring provider: Saskia Ramirez DO  Dx:   Encounter Diagnosis     ICD-10-CM    1. Acute pain of left knee  M25.562       2. Chronic midline low back pain, unspecified whether sciatica present  M54.50     G89.29                      Subjective: patient states that the left knee is painful and so is her back today. She has been having a fair time with her knee but the back has been more irritated lately. Objective: See treatment diary below      Assessment: patient was progressed with manual therapy and general exercise which she tolerated well. She was improved following treatment. Plan: Continue per plan of care.       Precautions: none          Manuals             Left knee ROM 10min                                                                                                                                                           Ther Ex             Heel slides             Quad sets 10x2            Gluteal sets 10x2            clamshells 10x2            Ankle pumps 10x2            Knee pendulums 10x2             Wall squats 20x            LAQ 10x3 5lbs            Standing hip abduction 30x                                                                             Modalities             ice

## 2023-07-11 ENCOUNTER — OFFICE VISIT (OUTPATIENT)
Dept: PHYSICAL THERAPY | Facility: MEDICAL CENTER | Age: 53
End: 2023-07-11
Payer: COMMERCIAL

## 2023-07-11 DIAGNOSIS — M25.562 ACUTE PAIN OF LEFT KNEE: Primary | ICD-10-CM

## 2023-07-11 DIAGNOSIS — M54.50 CHRONIC MIDLINE LOW BACK PAIN, UNSPECIFIED WHETHER SCIATICA PRESENT: ICD-10-CM

## 2023-07-11 DIAGNOSIS — G89.29 CHRONIC MIDLINE LOW BACK PAIN, UNSPECIFIED WHETHER SCIATICA PRESENT: ICD-10-CM

## 2023-07-11 PROCEDURE — 97140 MANUAL THERAPY 1/> REGIONS: CPT | Performed by: PHYSICAL THERAPIST

## 2023-07-11 PROCEDURE — 97110 THERAPEUTIC EXERCISES: CPT | Performed by: PHYSICAL THERAPIST

## 2023-07-11 NOTE — PROGRESS NOTES
Daily Note     Today's date: 2023  Patient name: Brady Alcantar  : 1970  MRN: [id number]  Referring provider: Desmond Barth DO  Dx:   Encounter Diagnosis     ICD-10-CM    1. Acute pain of left knee  M25.562       2. Chronic midline low back pain, unspecified whether sciatica present  M54.50     G89.29                      Subjective: patient states that the left knee getting better and has returned to base line discomfort. Patient is still very fearful of hurting her knee again. Objective: See treatment diary below      Assessment: patient was progressed with manual therapy and general exercise which she tolerated well. She was improved following treatment. Plan: Continue per plan of care.       Precautions: none          Manuals             Left knee ROM 10min                                                                                                                                                           Ther Ex             Heel slides             Quad sets 10x2            Gluteal sets 10x2            clamshells 10x2            Ankle pumps 10x2            Knee pendulums 10x2             Wall squats 20x            LAQ 10x3 5lbs            Standing hip abduction 30x                                                                             Modalities             ice

## 2023-07-18 ENCOUNTER — OFFICE VISIT (OUTPATIENT)
Dept: PHYSICAL THERAPY | Facility: MEDICAL CENTER | Age: 53
End: 2023-07-18
Payer: COMMERCIAL

## 2023-07-18 DIAGNOSIS — M25.562 ACUTE PAIN OF LEFT KNEE: Primary | ICD-10-CM

## 2023-07-18 DIAGNOSIS — G89.29 CHRONIC MIDLINE LOW BACK PAIN, UNSPECIFIED WHETHER SCIATICA PRESENT: ICD-10-CM

## 2023-07-18 DIAGNOSIS — M54.50 CHRONIC MIDLINE LOW BACK PAIN, UNSPECIFIED WHETHER SCIATICA PRESENT: ICD-10-CM

## 2023-07-18 PROCEDURE — 97110 THERAPEUTIC EXERCISES: CPT | Performed by: PHYSICAL THERAPIST

## 2023-07-18 NOTE — PROGRESS NOTES
Daily Note     Today's date: 2023  Patient name: Kristofer Kelly  : 1970  MRN: [id number]  Referring provider: Tiffany Mcdonald DO  Dx:   Encounter Diagnosis     ICD-10-CM    1. Acute pain of left knee  M25.562       2. Chronic midline low back pain, unspecified whether sciatica present  M54.50     G89.29                      Subjective: patient states that the left knee is doing OK. It continues to be problematic but she is managing. Objective: See treatment diary below      Assessment: patient was progressed with manual therapy and general exercise which she tolerated well. She was improved following treatment. Plan: Continue per plan of care.       Precautions: none          Manuals             Left knee ROM                                                                                                                                                            Ther Ex             Heel slides             Quad sets 10x2            Gluteal sets 10x2            clamshells 10x2            Ankle pumps 10x2            Knee pendulums 10x2             Wall squats 20x            LAQ 10x3 5lbs            Standing hip abduction 30x            Standing knee flexion 30x            Standing hip extension 30x                                                   Modalities             ice

## 2023-07-25 ENCOUNTER — OFFICE VISIT (OUTPATIENT)
Dept: PHYSICAL THERAPY | Facility: MEDICAL CENTER | Age: 53
End: 2023-07-25
Payer: COMMERCIAL

## 2023-07-25 DIAGNOSIS — G89.29 CHRONIC MIDLINE LOW BACK PAIN, UNSPECIFIED WHETHER SCIATICA PRESENT: ICD-10-CM

## 2023-07-25 DIAGNOSIS — M54.50 CHRONIC MIDLINE LOW BACK PAIN, UNSPECIFIED WHETHER SCIATICA PRESENT: ICD-10-CM

## 2023-07-25 DIAGNOSIS — M25.562 ACUTE PAIN OF LEFT KNEE: Primary | ICD-10-CM

## 2023-07-25 PROCEDURE — 97140 MANUAL THERAPY 1/> REGIONS: CPT | Performed by: PHYSICAL THERAPIST

## 2023-07-25 PROCEDURE — 97110 THERAPEUTIC EXERCISES: CPT | Performed by: PHYSICAL THERAPIST

## 2023-08-08 ENCOUNTER — OFFICE VISIT (OUTPATIENT)
Dept: PHYSICAL THERAPY | Facility: MEDICAL CENTER | Age: 53
End: 2023-08-08
Payer: COMMERCIAL

## 2023-08-08 DIAGNOSIS — G89.29 CHRONIC MIDLINE LOW BACK PAIN, UNSPECIFIED WHETHER SCIATICA PRESENT: ICD-10-CM

## 2023-08-08 DIAGNOSIS — M54.50 CHRONIC MIDLINE LOW BACK PAIN, UNSPECIFIED WHETHER SCIATICA PRESENT: ICD-10-CM

## 2023-08-08 DIAGNOSIS — M25.562 ACUTE PAIN OF LEFT KNEE: Primary | ICD-10-CM

## 2023-08-08 PROCEDURE — 97110 THERAPEUTIC EXERCISES: CPT | Performed by: PHYSICAL THERAPIST

## 2023-08-08 PROCEDURE — 97112 NEUROMUSCULAR REEDUCATION: CPT | Performed by: PHYSICAL THERAPIST

## 2023-08-08 NOTE — PROGRESS NOTES
Daily Note     Today's date: 2023  Patient name: Yany Ramírez  : 1970  MRN: [id number]  Referring provider: Devonte Thomas DO  Dx:   Encounter Diagnosis     ICD-10-CM    1. Acute pain of left knee  M25.562       2. Chronic midline low back pain, unspecified whether sciatica present  M54.50     G89.29                      Subjective: patient states that the left knee is doing OK. Objective: See treatment diary below      Assessment: patient was progressed with manual therapy and general exercise which she tolerated well. She was improved following treatment. Plan: Continue per plan of care.       Precautions: none          Manuals             Left knee ROM                                                                                                                                                            Ther Ex             Heel slides             Quad sets 10x2            Gluteal sets 10x2            clamshells 10x2            Ankle pumps 10x2            Knee pendulums 10x2             Wall squats 20x            LAQ 10x3 5lbs            Standing hip abduction 30x            Standing knee flexion 30x            Standing hip extension 30x                                                   Modalities             ice

## 2023-08-24 ENCOUNTER — APPOINTMENT (OUTPATIENT)
Dept: PHYSICAL THERAPY | Facility: MEDICAL CENTER | Age: 53
End: 2023-08-24
Payer: COMMERCIAL

## 2023-10-05 ENCOUNTER — TELEPHONE (OUTPATIENT)
Dept: ADMINISTRATIVE | Facility: OTHER | Age: 53
End: 2023-10-05

## 2023-10-05 NOTE — TELEPHONE ENCOUNTER
----- Message from [facility] sent at 10/4/2023  3:43 PM EDT -----  10/04/23 3:44 PM    Karen, our patient Hiram Maher has had Mammogram completed/performed. Please assist in updating the patient chart by pulling the Care Everywhere (CE) document. The date of service is 9/27/2023.      Thank you,  Oscar Roque
10/05/23 8:39 AM     [employer] SmartForm used to document caregap status.     Martinez Becerra MA
Upon review of the In Basket request we were able to locate, review, and update the patient chart as requested for Mammogram.    Any additional questions or concerns should be emailed to the Practice Liaisons via the appropriate education email address, please do not reply via In Basket.     Thank you  Lindsey Saha MA
good balance

## 2023-10-27 ENCOUNTER — RA CDI HCC (OUTPATIENT)
Dept: OTHER | Facility: HOSPITAL | Age: 53
End: 2023-10-27

## 2023-10-27 NOTE — PROGRESS NOTES
[facility] coding opportunities          Chart Reviewed number of suggestions sent to Provider: 1     Patients Insurance        Commercial Insurance: Tulio Coronado

## 2023-11-02 ENCOUNTER — OFFICE VISIT (OUTPATIENT)
Dept: FAMILY MEDICINE CLINIC | Facility: CLINIC | Age: 53
End: 2023-11-02
Payer: COMMERCIAL

## 2023-11-02 VITALS
OXYGEN SATURATION: 99 % | DIASTOLIC BLOOD PRESSURE: 88 MMHG | WEIGHT: 233.2 LBS | SYSTOLIC BLOOD PRESSURE: 148 MMHG | HEART RATE: 44 BPM | HEIGHT: 62 IN | BODY MASS INDEX: 42.91 KG/M2

## 2023-11-02 DIAGNOSIS — E66.01 MORBID OBESITY (HCC): ICD-10-CM

## 2023-11-02 DIAGNOSIS — R00.2 PALPITATIONS: ICD-10-CM

## 2023-11-02 DIAGNOSIS — R00.1 BRADYCARDIA: Primary | ICD-10-CM

## 2023-11-02 DIAGNOSIS — G47.8 UNREFRESHED BY SLEEP: ICD-10-CM

## 2023-11-02 PROCEDURE — 93000 ELECTROCARDIOGRAM COMPLETE: CPT | Performed by: FAMILY MEDICINE

## 2023-11-02 PROCEDURE — 99214 OFFICE O/P EST MOD 30 MIN: CPT | Performed by: FAMILY MEDICINE

## 2023-11-02 RX ORDER — IBUPROFEN 600 MG/1
TABLET ORAL
COMMUNITY
Start: 2023-06-30

## 2023-11-02 NOTE — PATIENT INSTRUCTIONS
Heart Palpitations   AMBULATORY CARE:   Heart palpitations  are feelings that your heart races, jumps, throbs, or flutters. You may feel extra beats, no beats for a short time, or skipped beats. You may have these feelings in your chest, throat, or neck. They may happen when you are sitting, standing, or lying. Heart palpitations may be frightening, but are usually not caused by a serious problem. Call 911 or have someone else call for any of the following: You have any of the following signs of a heart attack:      Squeezing, pressure, or pain in your chest    You may  also have any of the following:     Discomfort or pain in your back, neck, jaw, stomach, or arm    Shortness of breath    Nausea or vomiting    Lightheadedness or a sudden cold sweat    You have any of the following signs of a stroke:      Numbness or drooping on one side of your face     Weakness in an arm or leg    Confusion or difficulty speaking    Dizziness, a severe headache, or vision loss    You faint or lose consciousness. Seek care immediately if:   Your palpitations happen more often or last longer than usual.     You have palpitations and shortness of breath, nausea, sweating, or dizziness. Contact your healthcare provider if:   You have questions or concerns about your condition or care. Follow up with your healthcare provider as directed: You may need to follow up with a cardiologist. Holley Stokes may need tests to check for heart problems that cause palpitations. Write down your questions so you remember to ask them during your visits. Treatment for heart palpitations  is usually not needed. Your healthcare provider may stop or change your medicines if they are causing your palpitations. Conditions that cause palpitations, such as an abnormal heartbeat, will be treated. Keep a record:  Write down when your palpitations start and stop, what you were doing when they started, and your symptoms.  Keep track of what you ate or drank within a few hours of your palpitations. Include anything that seemed to help your symptoms, such as lying down or holding your breath. This record will help you and your healthcare provider learn what triggers your palpitations. Bring this record with you to your follow up visits. Help prevent heart palpitations:   Manage stress and anxiety. Find ways to relax such as listening to music, meditating, or doing yoga. Exercise can also help decrease stress and anxiety. Talk to someone you trust about your stress or anxiety. You can also talk to a therapist.     Get plenty of sleep every night. Ask your healthcare provider how much sleep you need each night. Do not drink caffeine or alcohol. Caffeine and alcohol can make your palpitations worse. Caffeine is found in soda, coffee, tea, chocolate, and drinks that increase your energy. Do not smoke. Nicotine and other chemicals in cigarettes and cigars may damage your heart and blood vessels. Ask your healthcare provider for information if you currently smoke and need help to quit. E-cigarettes or smokeless tobacco still contain nicotine. Talk to your healthcare provider before you use these products. Do not use illegal drugs. Talk to your healthcare provider if you use illegal drugs and want help to quit. © Copyright Arias Aguayo 2023 Information is for End User's use only and may not be sold, redistributed or otherwise used for commercial purposes. The above information is an  only. It is not intended as medical advice for individual conditions or treatments. Talk to your doctor, nurse or pharmacist before following any medical regimen to see if it is safe and effective for you.

## 2023-11-02 NOTE — ASSESSMENT & PLAN NOTE
Patient snores a lot She is never rested and wakens often She was on xanax and that was stopped so she started benadryl 50mg at night I feel with her symptoms plus the obesity I would like her to have sleep study

## 2023-11-02 NOTE — ASSESSMENT & PLAN NOTE
Patient to have echo , labs holter monitor and see cardiology Patient to stop all supplements except multiple vitamin and calcium with D  If patient has worsening symptoms chest pain or diaphoresis she will go to ER

## 2023-11-02 NOTE — PROGRESS NOTES
Name: Lily Gaming      : 1970      MRN: [id number]  Encounter Provider: Nathaniel Levin DO  Encounter Date: 2023   Encounter department: [facility] PRIMARY CARE    Assessment & Plan     1. Bradycardia  Assessment & Plan:  Nocturnal bradycardia per patient apple watch Patient to decrease the benadryl to 25 mg at bedtime not 50 mg Patient to also decrease the wine as she has     Orders:  -     TSH, 3rd generation with Free T4 reflex; Future; Expected date: 2023  -     CBC and differential; Future; Expected date: 2023  -     Comprehensive metabolic panel; Future; Expected date: 2023  -     Holter monitor; Future; Expected date: 2023  -     Ambulatory Referral to Cardiology; Future    2. Morbid obesity ([address])  Assessment & Plan:  Diet discussed at length I have advised walking more also     Orders:  -     Ambulatory Referral to Cardiology; Future    3. Unrefreshed by sleep  Assessment & Plan:  Patient snores a lot She is never rested and wakens often She was on xanax and that was stopped so she started benadryl 50mg at night I feel with her symptoms plus the obesity I would like her to have sleep study    Orders:  -     Home Study; Future  -     Holter monitor; Future; Expected date: 2023  -     Ambulatory Referral to Cardiology; Future    4. Palpitations  Assessment & Plan:  Patient to have echo , labs holter monitor and see cardiology Patient to stop all supplements except multiple vitamin and calcium with D  If patient has worsening symptoms chest pain or diaphoresis she will go to ER     Orders:  -     TSH, 3rd generation with Free T4 reflex; Future; Expected date: 2023  -     CBC and differential; Future; Expected date: 2023  -     Comprehensive metabolic panel; Future; Expected date: 2023  -     Echo complete w/ contrast if indicated; Future; Expected date: 2023  -     POCT ECG  -     Holter monitor;  Future; Expected date: 11/02/2023  -     Ambulatory Referral to Cardiology; Future         Chief Complaint   Patient presents with    Irregular Heart Beat     Patient states she has irregular heart beats since 10/18/2023    Dizziness     Patient states she gets dizzy and lightheaded some times 10/14/2023      Subjective      Patient is here due to her apple watch is showing that at night she has low pulse as low as the 40's since 10/18/2023 Patient also states that pulse can be as high as 140 Patient has been having some palpitations and also some dizziness on and off Patient takes and has for years several supplements omega 3 fatty acid several multiple supplements for joints and also  tumeric Patient takes 50 mg of benadryl at bedtime She drinks 40 oz of coffee in the morning Patient also was drinking 4-5 oz wine per night and now for last 2 weeks decreased that to 2 oz Patient notes no stress issues She only sleeps 6hrs per night She is tired all the time and snores These sleep issues are unchnaged She also is still menstruating and sometimes has heavy period which she did this month     Palpitations  This is a new problem. The current episode started 1 to 4 weeks ago. The problem occurs daily. The problem has been gradually worsening. Associated symptoms include fatigue and vertigo. Pertinent negatives include no abdominal pain, anorexia, arthralgias, change in bowel habit, chest pain, chills, congestion, coughing, diaphoresis, fever, headaches, joint swelling, myalgias, nausea, neck pain, numbness, rash, sore throat, swollen glands, urinary symptoms, visual change, vomiting or weakness. Nothing aggravates the symptoms. She has tried nothing for the symptoms. Review of Systems   Constitutional:  Positive for fatigue. Negative for chills, diaphoresis and fever. HENT:  Negative for congestion and sore throat. Respiratory:  Negative for cough. Cardiovascular:  Negative for chest pain.    Gastrointestinal:  Negative for abdominal pain, anorexia, change in bowel habit, nausea and vomiting. Musculoskeletal:  Negative for arthralgias, joint swelling, myalgias and neck pain. Skin:  Negative for rash. Neurological:  Positive for vertigo and light-headedness. Negative for weakness, numbness and headaches. Psychiatric/Behavioral:  Positive for decreased concentration and sleep disturbance. Current Outpatient Medications on File Prior to Visit   Medication Sig    ibuprofen (MOTRIN) 600 mg tablet     EPINEPHrine (EPIPEN) 0.3 mg/0.3 mL SOAJ Inject 0.3 mL (0.3 mg total) into a muscle once for 1 dose       Objective     /88 (BP Location: Left arm, Patient Position: Sitting, Cuff Size: Adult)   Pulse (!) 44   Ht 5' 2" (1.575 m)   Wt 106 kg (233 lb 3.2 oz)   LMP 10/14/2023 (Exact Date)   SpO2 99%   BMI 42.65 kg/m²     Physical Exam  Vitals and nursing note reviewed. Constitutional:       Appearance: She is well-developed. She is obese. HENT:      Head: Normocephalic and atraumatic. Right Ear: Hearing, tympanic membrane and external ear normal.      Left Ear: Hearing, tympanic membrane and external ear normal.   Eyes:      Extraocular Movements: Extraocular movements intact. Conjunctiva/sclera: Conjunctivae normal.      Pupils: Pupils are equal, round, and reactive to light. Neck:      Thyroid: No thyromegaly. Cardiovascular:      Rate and Rhythm: Normal rate. Heart sounds: Normal heart sounds. Pulmonary:      Effort: Pulmonary effort is normal.      Breath sounds: Normal breath sounds. No wheezing or rales. Abdominal:      General: Bowel sounds are normal. There is no distension. Palpations: Abdomen is soft. Tenderness: There is no abdominal tenderness. Musculoskeletal:         General: No tenderness. Cervical back: Neck supple. Lymphadenopathy:      Cervical: No cervical adenopathy. Skin:     General: Skin is warm and dry. Findings: No rash.    Neurological: General: No focal deficit present. Mental Status: She is alert and oriented to person, place, and time. Cranial Nerves: No cranial nerve deficit. Coordination: Coordination normal.   Psychiatric:         Mood and Affect: Mood normal.         Behavior: Behavior normal.         Thought Content:  Thought content normal.         Judgment: Judgment normal.       Simeon Blue,

## 2023-11-02 NOTE — ASSESSMENT & PLAN NOTE
Nocturnal bradycardia per patient apple watch Patient to decrease the benadryl to 25 mg at bedtime not 50 mg Patient to also decrease the wine as she has

## 2023-11-03 ENCOUNTER — CONSULT (OUTPATIENT)
Dept: CARDIOLOGY CLINIC | Facility: CLINIC | Age: 53
End: 2023-11-03
Payer: COMMERCIAL

## 2023-11-03 ENCOUNTER — APPOINTMENT (OUTPATIENT)
Dept: LAB | Facility: CLINIC | Age: 53
End: 2023-11-03
Payer: COMMERCIAL

## 2023-11-03 VITALS
DIASTOLIC BLOOD PRESSURE: 78 MMHG | OXYGEN SATURATION: 98 % | HEIGHT: 62 IN | BODY MASS INDEX: 41.96 KG/M2 | WEIGHT: 228 LBS | HEART RATE: 75 BPM | SYSTOLIC BLOOD PRESSURE: 136 MMHG

## 2023-11-03 DIAGNOSIS — R00.1 BRADYCARDIA: ICD-10-CM

## 2023-11-03 DIAGNOSIS — R00.2 PALPITATIONS: ICD-10-CM

## 2023-11-03 DIAGNOSIS — E66.01 MORBID OBESITY (HCC): ICD-10-CM

## 2023-11-03 DIAGNOSIS — G47.8 UNREFRESHED BY SLEEP: ICD-10-CM

## 2023-11-03 LAB
ALBUMIN SERPL BCP-MCNC: 4.5 G/DL (ref 3.5–5)
ALP SERPL-CCNC: 47 U/L (ref 34–104)
ALT SERPL W P-5'-P-CCNC: 16 U/L (ref 7–52)
ANION GAP SERPL CALCULATED.3IONS-SCNC: 7 MMOL/L
AST SERPL W P-5'-P-CCNC: 18 U/L (ref 13–39)
BASOPHILS # BLD AUTO: 0.09 THOUSANDS/ÂΜL (ref 0–0.1)
BASOPHILS NFR BLD AUTO: 1 % (ref 0–1)
BILIRUB SERPL-MCNC: 0.51 MG/DL (ref 0.2–1)
BUN SERPL-MCNC: 19 MG/DL (ref 5–25)
CALCIUM SERPL-MCNC: 9.3 MG/DL (ref 8.4–10.2)
CHLORIDE SERPL-SCNC: 103 MMOL/L (ref 96–108)
CO2 SERPL-SCNC: 27 MMOL/L (ref 21–32)
CREAT SERPL-MCNC: 1.13 MG/DL (ref 0.6–1.3)
EOSINOPHIL # BLD AUTO: 0.26 THOUSAND/ÂΜL (ref 0–0.61)
EOSINOPHIL NFR BLD AUTO: 4 % (ref 0–6)
ERYTHROCYTE [DISTWIDTH] IN BLOOD BY AUTOMATED COUNT: 14.4 % (ref 11.6–15.1)
GFR SERPL CREATININE-BSD FRML MDRD: 55 ML/MIN/1.73SQ M
GLUCOSE P FAST SERPL-MCNC: 94 MG/DL (ref 65–99)
HCT VFR BLD AUTO: 41 % (ref 34.8–46.1)
HGB BLD-MCNC: 13.8 G/DL (ref 11.5–15.4)
IMM GRANULOCYTES # BLD AUTO: 0.02 THOUSAND/UL (ref 0–0.2)
IMM GRANULOCYTES NFR BLD AUTO: 0 % (ref 0–2)
LYMPHOCYTES # BLD AUTO: 2.24 THOUSANDS/ÂΜL (ref 0.6–4.47)
LYMPHOCYTES NFR BLD AUTO: 34 % (ref 14–44)
MCH RBC QN AUTO: 30.1 PG (ref 26.8–34.3)
MCHC RBC AUTO-ENTMCNC: 33.7 G/DL (ref 31.4–37.4)
MCV RBC AUTO: 90 FL (ref 82–98)
MONOCYTES # BLD AUTO: 0.62 THOUSAND/ÂΜL (ref 0.17–1.22)
MONOCYTES NFR BLD AUTO: 9 % (ref 4–12)
NEUTROPHILS # BLD AUTO: 3.46 THOUSANDS/ÂΜL (ref 1.85–7.62)
NEUTS SEG NFR BLD AUTO: 52 % (ref 43–75)
NRBC BLD AUTO-RTO: 0 /100 WBCS
PLATELET # BLD AUTO: 326 THOUSANDS/UL (ref 149–390)
PMV BLD AUTO: 12.2 FL (ref 8.9–12.7)
POTASSIUM SERPL-SCNC: 4.3 MMOL/L (ref 3.5–5.3)
PROT SERPL-MCNC: 7.3 G/DL (ref 6.4–8.4)
RBC # BLD AUTO: 4.58 MILLION/UL (ref 3.81–5.12)
SODIUM SERPL-SCNC: 137 MMOL/L (ref 135–147)
T4 FREE SERPL-MCNC: 0.89 NG/DL (ref 0.61–1.12)
TSH SERPL DL<=0.05 MIU/L-ACNC: 4.68 UIU/ML (ref 0.45–4.5)
WBC # BLD AUTO: 6.69 THOUSAND/UL (ref 4.31–10.16)

## 2023-11-03 PROCEDURE — 80053 COMPREHEN METABOLIC PANEL: CPT

## 2023-11-03 PROCEDURE — 99244 OFF/OP CNSLTJ NEW/EST MOD 40: CPT | Performed by: INTERNAL MEDICINE

## 2023-11-03 PROCEDURE — 84439 ASSAY OF FREE THYROXINE: CPT

## 2023-11-03 PROCEDURE — 85025 COMPLETE CBC W/AUTO DIFF WBC: CPT

## 2023-11-03 PROCEDURE — 36415 COLL VENOUS BLD VENIPUNCTURE: CPT

## 2023-11-03 PROCEDURE — 84443 ASSAY THYROID STIM HORMONE: CPT

## 2023-11-03 NOTE — PROGRESS NOTES
[facility] [place]   [address]  [address]  [facility] [address]                                            Cardiology Office Consult  Rubia Grey, 48 y.o. female  YOB: 1970  MRN: [id number] Encounter: [id number]      PCP - Simeon Blue DO  Referring Provider - Simeon Blue DO    Chief Complaint   Patient presents with   • New Patient Visit     Referred by pcp for dizziness    • Irregular Heart Beat     Down in the 45s while sleeping    • Palpitations   • Arm Pain       Assessment  Bradycardia  Palpitations  Frequent PVCs  Morbid Obesity, Body mass index is 41.7 kg/m². JENNI    Plan  Bradycardia, Frequent PVCs, Palpitations  ECG 11/2/23 - NSR with frequent PVCs  I explained to her that the bradycardia is related to the compensatory pauses, ectopic beats  She has no dizziness or lightheadedness, or any other evidence or suggestion she may have high-grade AV block or sinus pauses  Risk factors  Increased stress/knee pain  Excess caffeine intake --> minimize caffeine  JENNI --> complete sleep study (already ordered by PCP)  Plan  Check 48-hour Holter monitor  Check echocardiogram  Increase cardio activities with exercises like rowing/swimming/elliptical as able  Weight loss recommended    Morbid obesity, Body mass index is 41.7 kg/m². She has gained 40 pounds over the last 6 years, largely from falling off her diet  Previously, she used to follow a strict diet, but eventually got tired of it and has fallen off the tracks  Counseled on dietary modifications and need to increase exercise to pursue weight   She has delayed gastric emptying on nuclear test in 2014, and as result reports being unable to eat more fiber    No results found for this visit on 11/03/23. Orders Placed This Encounter   Procedures   • Holter monitor     Return in about 6 weeks (around 12/15/2023), or if symptoms worsen or fail to improve. History of Present Illness   48 y.o. female , , comes in as a new patient for consultation regarding recent episodes of bradycardia. Over the last 3 months, she has been dealing with issues with left knee pain and has been getting physical therapy. She has been under increased stress. Factors for the same. During this, she started to notice that her heart rate has been dropping down to the 40s at times on her Fitbit. She subsequently got an iWatch SE and has continued to monitor her heart rate on the same. In addition, she has had intermittent symptoms of palpitations occurring as well. No complaints of dizziness or lightheadedness, near-syncope or syncope. No known prior history of CAD or heart failure. Family history  Father -  of blood cancer/COVID at 70. No heart problems  Mother - alive at 76. breast cancer in remission. No known heart problems. Siblings: 1 younger brother - no medical problems      Historical Information   Past Medical History:   Diagnosis Date   • Abnormal weight loss     Resolved 7/15/2015    • Acrochordon     Right axillary x2.  Resolved 7/15/2015    • Former smoker    • Gastroparesis     Resolved 2017    • Neoplasm of skin of back     Resolved 2014    • Walking pneumonia     Resolved 2017      Past Surgical History:   Procedure Laterality Date   • ARTHROSCOPY KNEE     • COLONOSCOPY  2015   • ELBOW BURSA SURGERY     • ESOPHAGOGASTRODUODENOSCOPY  10/20/2014    Diagnostic    • SHOULDER ARTHROSCOPY     • THORACIC OUTLET SURGERY     • TUBAL LIGATION     • WRIST SURGERY Right      Family History   Problem Relation Age of Onset   • Breast cancer Mother    • Hypertension Father    • Cancer Father    • No Known Problems Brother    • No Known Problems Maternal Grandmother    • No Known Problems Maternal Grandfather    • Hypertension Paternal Grandmother    • Dementia Paternal Grandmother    • No Known Problems Paternal Grandfather      Current Outpatient Medications on File Prior to Visit   Medication Sig Dispense Refill   • EPINEPHrine (EPIPEN) 0.3 mg/0.3 mL SOAJ Inject 0.3 mL (0.3 mg total) into a muscle once for 1 dose 2 each 0   • ibuprofen (MOTRIN) 600 mg tablet        No current facility-administered medications on file prior to visit. Allergies   Allergen Reactions   • Honey Bee Venom Protein  [Bee Venom] Hives   • Tobramycin Other (See Comments)     Bubble on eye       Social History     Socioeconomic History   • Marital status: /Civil Union     Spouse name: None   • Number of children: None   • Years of education: None   • Highest education level: None   Occupational History   • None   Tobacco Use   • Smoking status: Former     Packs/day: 0.50     Years: 10.00     Total pack years: 5.00     Types: Cigarettes, Cigars     Quit date: 2006     Years since quittin.7     Passive exposure: Never   • Smokeless tobacco: Never   Vaping Use   • Vaping Use: Never used   Substance and Sexual Activity   • Alcohol use: Yes     Comment: A glass of red wine w/dinner   • Drug use: Never     Comment: Denied: History of drug use - As per [employer]    • Sexual activity: Yes     Partners: Male     Birth control/protection: Female Sterilization     Comment: With my  Morena Purdy been with since    Other Topics Concern   • None   Social History Narrative   • None     Social Determinants of Health     Financial Resource Strain: Not on file   Food Insecurity: Not on file   Transportation Needs: Not on file   Physical Activity: Not on file   Stress: Not on file   Social Connections: Not on file   Intimate Partner Violence: Not on file   Housing Stability: Not on file        Review of Systems   All other systems reviewed and are negative. Vitals:  Vitals:    23 1053   BP: 136/78   BP Location: Left arm   Patient Position: Sitting   Cuff Size: Standard   Pulse: 75   SpO2: 98%   Weight: 103 kg (228 lb)   Height: 5' 2" (1.575 m)     BMI - Body mass index is 41.7 kg/m².   Wt Readings from Last 7 Encounters:   11/03/23 103 kg (228 lb)   11/02/23 106 kg (233 lb 3.2 oz)   03/08/23 107 kg (234 lb 12.8 oz)   02/07/23 108 kg (237 lb)   05/14/19 99 kg (218 lb 3.2 oz)   07/10/17 93 kg (205 lb)   02/01/17 84.4 kg (186 lb)       Physical Exam  Vitals and nursing note reviewed. Constitutional:       General: She is not in acute distress. Appearance: Normal appearance. She is well-developed. She is obese. She is not ill-appearing. HENT:      Head: Normocephalic and atraumatic. Nose: No congestion. Eyes:      General: No scleral icterus. Conjunctiva/sclera: Conjunctivae normal.   Neck:      Vascular: No carotid bruit or JVD. Cardiovascular:      Rate and Rhythm: Normal rate and regular rhythm. Occasional Extrasystoles are present. Pulses: Normal pulses. Heart sounds: Normal heart sounds. No murmur heard. No friction rub. No gallop. Pulmonary:      Effort: Pulmonary effort is normal. No respiratory distress. Breath sounds: Normal breath sounds. No rales. Abdominal:      General: There is no distension. Palpations: Abdomen is soft. Tenderness: There is no abdominal tenderness. Musculoskeletal:         General: No swelling or tenderness. Cervical back: Neck supple. Right lower leg: No edema. Left lower leg: No edema. Comments: Left knee brace noted in place   Skin:     General: Skin is warm. Neurological:      General: No focal deficit present. Mental Status: She is alert and oriented to person, place, and time. Mental status is at baseline. Psychiatric:         Mood and Affect: Mood normal.         Behavior: Behavior normal.         Thought Content:  Thought content normal.         Labs:  CBC:   Lab Results   Component Value Date    WBC 6.69 11/03/2023    RBC 4.58 11/03/2023    HGB 13.8 11/03/2023    HCT 41.0 11/03/2023    MCV 90 11/03/2023     11/03/2023    RDW 14.4 11/03/2023       CMP:   Lab Results Component Value Date     11/19/2014    K 4.3 11/03/2023     11/03/2023    CO2 27 11/03/2023    ANIONGAP 8 11/19/2014    BUN 19 11/03/2023    CREATININE 1.13 11/03/2023    EGFR 55 11/03/2023    GLUCOSE 91 11/19/2014    CALCIUM 9.3 11/03/2023    AST 18 11/03/2023    ALT 16 11/03/2023    ALKPHOS 47 11/03/2023    PROT 7.8 11/19/2014    BILITOT 0.2 11/19/2014       Magnesium:  No results found for: "MG"    Lipid Profile:   Lab Results   Component Value Date    HDL 66 02/08/2023    TRIG 52 02/08/2023    LDLCALC 108 (H) 02/08/2023       Thyroid Studies:   Lab Results   Component Value Date    ORK0IFSXDISU 4.677 (H) 11/03/2023    FREET4 0.89 11/03/2023       A1c:  No components found for: "HGA1C"    INR:  No results found for: "INR"5    Imaging: No results found. Cardiac testing:   No results found for this or any previous visit. No results found for this or any previous visit. No results found for this or any previous visit. No results found for this or any previous visit. Hm Mammography  This Care Everywhere (CE) document can be found within the Care Everywhere (CE) Activity and Chart Review: Encounters Tab. Linking this document to the order is not available at this time.

## 2023-11-06 ENCOUNTER — TELEPHONE (OUTPATIENT)
Dept: SLEEP CENTER | Facility: CLINIC | Age: 53
End: 2023-11-06

## 2023-11-06 NOTE — TELEPHONE ENCOUNTER
----- Message from Diallo Mei MD sent at 11/5/2023  6:47 PM EST -----  approved  ----- Message -----  From: Jayson Cancino  Sent: 11/3/2023  12:02 PM EST  To: Sleep Medicine [facility] Provider    This Home sleep study needs approval.     If approved please sign and return to clerical pool. If denied please include reasons why. Also provide alternative testing if warranted. Please sign and return to clerical pool.

## 2023-11-06 NOTE — TELEPHONE ENCOUNTER
----- Message from Leon French MD sent at 11/5/2023  6:47 PM EST -----  approved  ----- Message -----  From: Benton Del Valle  Sent: 11/3/2023  12:02 PM EST  To: Sleep Medicine [facility] Provider    This Home sleep study needs approval.     If approved please sign and return to clerical pool. If denied please include reasons why. Also provide alternative testing if warranted. Please sign and return to clerical pool.

## 2023-11-17 ENCOUNTER — HOSPITAL ENCOUNTER (OUTPATIENT)
Dept: SLEEP CENTER | Facility: CLINIC | Age: 53
Discharge: HOME | End: 2023-11-17
Payer: COMMERCIAL

## 2023-11-17 ENCOUNTER — HOSPITAL ENCOUNTER (OUTPATIENT)
Dept: NON INVASIVE DIAGNOSTICS | Facility: HOSPITAL | Age: 53
Discharge: HOME | End: 2023-11-17
Payer: COMMERCIAL

## 2023-11-17 VITALS
BODY MASS INDEX: 41.96 KG/M2 | DIASTOLIC BLOOD PRESSURE: 78 MMHG | HEIGHT: 62 IN | SYSTOLIC BLOOD PRESSURE: 136 MMHG | WEIGHT: 228 LBS | HEART RATE: 75 BPM

## 2023-11-17 DIAGNOSIS — R00.2 PALPITATIONS: ICD-10-CM

## 2023-11-17 DIAGNOSIS — G47.8 UNREFRESHED BY SLEEP: ICD-10-CM

## 2023-11-17 PROCEDURE — 93306 TTE W/DOPPLER COMPLETE: CPT

## 2023-11-17 PROCEDURE — G0399 HOME SLEEP TEST/TYPE 3 PORTA: HCPCS

## 2023-11-17 PROCEDURE — 93306 TTE W/DOPPLER COMPLETE: CPT | Performed by: INTERNAL MEDICINE

## 2023-11-17 NOTE — PROGRESS NOTES
Home Sleep Study Documentation    HOME STUDY DEVICE: Noxturnal no                                           Cecilia G3 yes      Pre-Sleep Home Study:    Set-up and instructions performed by: Shailesh Ha performed demonstration for Patient: yes    Return demonstration performed by Patient: yes    Written instructions provided to Patient: yes    Patient signed consent form: yes        Post-Sleep Home Study:    Additional comments by Patient: None    Home Sleep Study Failed:no:    Failure reason: N/A    Reported or Detected: N/A    Scored by: DRAGAN Isaac

## 2023-11-18 LAB
AORTIC ROOT: 3.2 CM
AORTIC VALVE MEAN VELOCITY: 10 M/S
APICAL FOUR CHAMBER EJECTION FRACTION: 50 %
ASCENDING AORTA: 3.4 CM
AV LVOT MEAN GRADIENT: 2 MMHG
AV LVOT PEAK GRADIENT: 4 MMHG
AV MEAN GRADIENT: 4 MMHG
AV PEAK GRADIENT: 8 MMHG
AV VELOCITY RATIO: 0.66
DOP CALC AO PEAK VEL: 1.46 M/S
DOP CALC AO VTI: 27.25 CM
DOP CALC LVOT PEAK VEL VTI: 19.77 CM
DOP CALC LVOT PEAK VEL: 0.97 M/S
E WAVE DECELERATION TIME: 186 MS
E/A RATIO: 1.36
FRACTIONAL SHORTENING: 36 (ref 28–44)
INTERVENTRICULAR SEPTUM IN DIASTOLE (PARASTERNAL SHORT AXIS VIEW): 0.8 CM
INTERVENTRICULAR SEPTUM: 0.8 CM (ref 0.6–1.1)
IVC: 16 MM
LAAS-AP2: 24.6 CM2
LAAS-AP4: 22.8 CM2
LEFT ATRIUM SIZE: 3.8 CM
LEFT ATRIUM VOLUME (MOD BIPLANE): 78 ML
LEFT ATRIUM VOLUME INDEX (MOD BIPLANE): 38.6 ML/M2
LEFT INTERNAL DIMENSION IN SYSTOLE: 3.2 CM (ref 2.1–4)
LEFT VENTRICULAR INTERNAL DIMENSION IN DIASTOLE: 5 CM (ref 3.5–6)
LEFT VENTRICULAR POSTERIOR WALL IN END DIASTOLE: 0.8 CM
LEFT VENTRICULAR STROKE VOLUME: 75 ML
LVSV (TEICH): 75 ML
MV E'TISSUE VEL-SEP: 11 CM/S
MV PEAK A VEL: 0.88 M/S
MV PEAK E VEL: 120 CM/S
MV STENOSIS PRESSURE HALF TIME: 54 MS
MV VALVE AREA P 1/2 METHOD: 4.07
RA PRESSURE ESTIMATED: 3 MMHG
RIGHT VENTRICLE ID DIMENSION: 2.8 CM
RV PSP: 22 MMHG
SL CV LEFT ATRIUM LENGTH A2C: 5.8 CM
SL CV LV EF: 55
SL CV PED ECHO LEFT VENTRICLE DIASTOLIC VOLUME (MOD BIPLANE) 2D: 116 ML
SL CV PED ECHO LEFT VENTRICLE SYSTOLIC VOLUME (MOD BIPLANE) 2D: 41 ML
TR MAX PG: 19 MMHG
TR PEAK VELOCITY: 2.2 M/S
TRICUSPID ANNULAR PLANE SYSTOLIC EXCURSION: 3 CM
TRICUSPID VALVE PEAK REGURGITATION VELOCITY: 2.17 M/S

## 2023-11-20 ENCOUNTER — HOSPITAL ENCOUNTER (OUTPATIENT)
Dept: NON INVASIVE DIAGNOSTICS | Facility: CLINIC | Age: 53
Discharge: HOME | End: 2023-11-20
Payer: COMMERCIAL

## 2023-11-20 DIAGNOSIS — R00.1 BRADYCARDIA: ICD-10-CM

## 2023-11-20 DIAGNOSIS — R00.2 PALPITATIONS: ICD-10-CM

## 2023-11-20 PROBLEM — G47.33 OBSTRUCTIVE SLEEP APNEA: Status: ACTIVE | Noted: 2023-11-20

## 2023-11-20 PROCEDURE — 93225 XTRNL ECG REC<48 HRS REC: CPT

## 2023-11-20 PROCEDURE — 93226 XTRNL ECG REC<48 HR SCAN A/R: CPT

## 2023-11-21 PROCEDURE — G0399 HOME SLEEP TEST/TYPE 3 PORTA: HCPCS | Performed by: INTERNAL MEDICINE

## 2023-11-28 PROCEDURE — 93227 XTRNL ECG REC<48 HR R&I: CPT | Performed by: INTERNAL MEDICINE

## 2023-12-05 ENCOUNTER — RA CDI HCC (OUTPATIENT)
Dept: OTHER | Facility: HOSPITAL | Age: 53
End: 2023-12-05

## 2023-12-05 NOTE — PROGRESS NOTES
[address] [facility] coding opportunities       Chart reviewed, no opportunity found: CHART REVIEWED, NO OPPORTUNITY FOUND        Patients Insurance        Commercial Insurance: Tulio Coronado Normal vision: sees adequately in most situations; can see medication labels, newsprint

## 2023-12-06 DIAGNOSIS — I49.3 FREQUENT PVCS: Primary | ICD-10-CM

## 2023-12-06 DIAGNOSIS — R00.1 BRADYCARDIA: ICD-10-CM

## 2023-12-06 DIAGNOSIS — R00.2 PALPITATIONS: ICD-10-CM

## 2023-12-06 RX ORDER — METOPROLOL SUCCINATE 25 MG/1
25 TABLET, EXTENDED RELEASE ORAL DAILY
Qty: 30 TABLET | Refills: 2 | Status: SHIPPED | OUTPATIENT
Start: 2023-12-06

## 2023-12-13 ENCOUNTER — OFFICE VISIT (OUTPATIENT)
Dept: CARDIOLOGY CLINIC | Facility: CLINIC | Age: 53
End: 2023-12-13
Payer: COMMERCIAL

## 2023-12-13 VITALS
SYSTOLIC BLOOD PRESSURE: 148 MMHG | WEIGHT: 228 LBS | OXYGEN SATURATION: 98 % | HEIGHT: 62 IN | HEART RATE: 73 BPM | BODY MASS INDEX: 41.96 KG/M2 | DIASTOLIC BLOOD PRESSURE: 90 MMHG

## 2023-12-13 DIAGNOSIS — I49.3 FREQUENT PVCS: Primary | ICD-10-CM

## 2023-12-13 DIAGNOSIS — E66.01 MORBID OBESITY (HCC): ICD-10-CM

## 2023-12-13 DIAGNOSIS — R00.2 PALPITATIONS: ICD-10-CM

## 2023-12-13 DIAGNOSIS — R00.1 BRADYCARDIA: ICD-10-CM

## 2023-12-13 PROCEDURE — 99214 OFFICE O/P EST MOD 30 MIN: CPT | Performed by: INTERNAL MEDICINE

## 2023-12-13 NOTE — PROGRESS NOTES
[place] [facility]   [address] [address]  [phone]                                            Cardiology Office Consult  Jolene Bruno, 48 y.o. female  YOB: 1970  MRN: [id number] Encounter: [id number]      PCP - Ronny Rivas DO  Referring Provider - No ref. provider found    No chief complaint on file. Assessment  Frequent PVCs  TTE 11/17/2023 -LVEF 55%, aortic sclerosis, mild AI/MR/TR  Bradycardia  Palpitations  Aortic sclerosis  Meningioma  Gets annual MRI  Morbid Obesity, Body mass index is 41.7 kg/m². JENNI    Plan  Bradycardia, Frequent PVCs, Palpitations, Hypertension  Overview  11/2/23 ECG  - NSR with frequent PVCs  11/3/23 initial OV with me - I explained to her that the bradycardia is related to the compensatory pauses, ectopic beats  She has no dizziness or lightheadedness, or any other evidence or suggestion she may have high-grade AV block or sinus pauses  11/20/23: Holter - frequent PVCs 15.5%, no NSVT / VT  Started on metoprolol succinate 25 mg daily  12/13/23 - reports feeling much better with palpitations / PVCs having subsided. No fatigue / dizziness. She does still occasionally notice the irregular heart beat  Risk factors  Increased stress/knee pain  Excess caffeine intake --> minimize caffeine  JENNI --> complete sleep study (already ordered by PCP)  Plan  Continue metoprolol succinate 25 mg daily   Complete exercise stress test   With her left knee pain, I am not sure that she will be able to complete exercise, but insists on completing the exercise test and feels she can do enough exercise  Bp higher today at 148/90  She will monitor BP at home, and monitor for continued or increased symptoms --> if BP remains elevated or having irregular heart beats, then we will plan on increasing metoprolol succinate to 50 mg daily    Morbid obesity, Body mass index is 41.7 kg/m².      She has gained 40 pounds over the last 6 years, largely from falling off her diet  Previously, she used to follow a strict diet, but eventually got tired of it and has fallen off the tracks  She has delayed gastric emptying on nuclear test in , and as result reports being unable to eat more fiber  Diet modifications, increase cardio exercises as able    No results found for this visit on 23. No orders of the defined types were placed in this encounter. Return in about 4 months (around 2024), or if symptoms worsen or fail to improve. History of Present Illness   48 y.o. female , , comes in as a new patient for consultation regarding recent episodes of bradycardia. Over the last 3 months, she has been dealing with issues with left knee pain and has been getting physical therapy. She has been under increased stress. Factors for the same. During this, she started to notice that her heart rate has been dropping down to the 40s at times on her Fitbit. She subsequently got an iWatch SE and has continued to monitor her heart rate on the same. In addition, she has had intermittent symptoms of palpitations occurring as well. No complaints of dizziness or lightheadedness, near-syncope or syncope. No known prior history of CAD or heart failure. Family history  Father -  of blood cancer/COVID at 70. No heart problems  Mother - alive at 76. breast cancer in remission. No known heart problems. Siblings: 1 younger brother - no medical problems    Interval history - 2023  She returns for follow-up after about 6 weeks. In the interim she completed the Holter monitor and this showed frequent PVCs. I started on metoprolol after this, and she has felt her PVCs have subsided and had irregular heartbeats no longer occurring as much.   No complaints of fatigue, dizziness or lightheadedness, near-syncope or syncope      Historical Information   Past Medical History:   Diagnosis Date   • Abnormal weight loss Resolved 7/15/2015    • Acrochordon     Right axillary x2. Resolved 7/15/2015    • Former smoker    • Gastroparesis     Resolved 2017    • Neoplasm of skin of back     Resolved 2014    • Walking pneumonia     Resolved 2017      Past Surgical History:   Procedure Laterality Date   • ARTHROSCOPY KNEE     • COLONOSCOPY  2015   • ELBOW BURSA SURGERY     • ESOPHAGOGASTRODUODENOSCOPY  10/20/2014    Diagnostic    • SHOULDER ARTHROSCOPY     • THORACIC OUTLET SURGERY     • TUBAL LIGATION     • WRIST SURGERY Right      Family History   Problem Relation Age of Onset   • Breast cancer Mother    • Hypertension Father    • Cancer Father    • No Known Problems Brother    • No Known Problems Maternal Grandmother    • No Known Problems Maternal Grandfather    • Hypertension Paternal Grandmother    • Dementia Paternal Grandmother    • No Known Problems Paternal Grandfather      Current Outpatient Medications on File Prior to Visit   Medication Sig Dispense Refill   • EPINEPHrine (EPIPEN) 0.3 mg/0.3 mL SOAJ Inject 0.3 mL (0.3 mg total) into a muscle once for 1 dose 2 each 0   • ibuprofen (MOTRIN) 600 mg tablet      • metoprolol succinate (TOPROL-XL) 25 mg 24 hr tablet Take 1 tablet (25 mg total) by mouth daily 30 tablet 2     No current facility-administered medications on file prior to visit.      Allergies   Allergen Reactions   • Honey Bee Venom Protein  [Bee Venom] Hives   • Tobramycin Other (See Comments)     Bubble on eye       Social History     Socioeconomic History   • Marital status: /Civil Union     Spouse name: None   • Number of children: None   • Years of education: None   • Highest education level: None   Occupational History   • None   Tobacco Use   • Smoking status: Former     Current packs/day: 0.00     Average packs/day: 0.5 packs/day for 10.0 years (5.0 ttl pk-yrs)     Types: Cigarettes, Cigars     Start date: 1996     Quit date: 2006     Years since quittin.8     Passive exposure: Never   • Smokeless tobacco: Never   Vaping Use   • Vaping status: Never Used   Substance and Sexual Activity   • Alcohol use: Yes     Comment: A glass of red wine w/dinner   • Drug use: Never     Comment: Denied: History of drug use - As per [employer]    • Sexual activity: Yes     Partners: Male     Birth control/protection: Female Sterilization     Comment: With my  Blake Clayton been with since 2006   Other Topics Concern   • None   Social History Narrative   • None     Social Determinants of Health     Financial Resource Strain: Not on file   Food Insecurity: Not on file   Transportation Needs: Not on file   Physical Activity: Not on file   Stress: Not on file   Social Connections: Not on file   Intimate Partner Violence: Not on file   Housing Stability: Not on file        Review of Systems   All other systems reviewed and are negative. Vitals:  Vitals:    12/13/23 0940   BP: 148/90   BP Location: Right arm   Patient Position: Sitting   Cuff Size: Large   Pulse: 73   SpO2: 98%   Weight: 103 kg (228 lb)   Height: 5' 2" (1.575 m)     BMI - Body mass index is 41.7 kg/m². Wt Readings from Last 7 Encounters:   12/13/23 103 kg (228 lb)   11/17/23 103 kg (228 lb)   11/03/23 103 kg (228 lb)   11/02/23 106 kg (233 lb 3.2 oz)   03/08/23 107 kg (234 lb 12.8 oz)   02/07/23 108 kg (237 lb)   05/14/19 99 kg (218 lb 3.2 oz)       Physical Exam  Vitals and nursing note reviewed. Constitutional:       General: She is not in acute distress. Appearance: Normal appearance. She is well-developed. She is obese. She is not ill-appearing. HENT:      Head: Normocephalic and atraumatic. Nose: No congestion. Eyes:      General: No scleral icterus. Conjunctiva/sclera: Conjunctivae normal.   Neck:      Vascular: No carotid bruit or JVD. Cardiovascular:      Rate and Rhythm: Normal rate and regular rhythm. Occasional Extrasystoles are present. Pulses: Normal pulses.       Heart sounds: Normal heart sounds. No murmur heard. No friction rub. No gallop. Pulmonary:      Effort: Pulmonary effort is normal. No respiratory distress. Breath sounds: Normal breath sounds. No rales. Abdominal:      General: There is no distension. Palpations: Abdomen is soft. Tenderness: There is no abdominal tenderness. Musculoskeletal:         General: No swelling or tenderness. Cervical back: Neck supple. Right lower leg: No edema. Left lower leg: No edema. Comments: Left knee brace noted in place   Skin:     General: Skin is warm. Neurological:      General: No focal deficit present. Mental Status: She is alert and oriented to person, place, and time. Mental status is at baseline. Psychiatric:         Mood and Affect: Mood normal.         Behavior: Behavior normal.         Thought Content: Thought content normal.         Labs:  CBC:   Lab Results   Component Value Date    WBC 6.69 11/03/2023    RBC 4.58 11/03/2023    HGB 13.8 11/03/2023    HCT 41.0 11/03/2023    MCV 90 11/03/2023     11/03/2023    RDW 14.4 11/03/2023       CMP:   Lab Results   Component Value Date     11/19/2014    K 4.3 11/03/2023     11/03/2023    CO2 27 11/03/2023    ANIONGAP 8 11/19/2014    BUN 19 11/03/2023    CREATININE 1.13 11/03/2023    EGFR 55 11/03/2023    GLUCOSE 91 11/19/2014    CALCIUM 9.3 11/03/2023    AST 18 11/03/2023    ALT 16 11/03/2023    ALKPHOS 47 11/03/2023    PROT 7.8 11/19/2014    BILITOT 0.2 11/19/2014       Magnesium:  No results found for: "MG"    Lipid Profile:   Lab Results   Component Value Date    HDL 66 02/08/2023    TRIG 52 02/08/2023    LDLCALC 108 (H) 02/08/2023       Thyroid Studies:   Lab Results   Component Value Date    [id number] 4.677 (H) 11/03/2023    FREET4 0.89 11/03/2023       A1c:  No components found for: "HGA1C"    INR:  No results found for: "INR"5    Imaging: No results found.     Cardiac testing:   No results found for this or any previous visit. No results found for this or any previous visit. No results found for this or any previous visit. No results found for this or any previous visit. Holter monitor  Narrative: INDICATIONS: Bradycardia    DESCRIPTION OF FINDINGS:  The patient was monitored for a total of 48 hours. The patient was   predominantly in sinus throughout the study. The average heart rate was   76 beats per minute. The heart rate ranged from a low of 57 beats per   minute to a maximum of 117 beats per minute. Ventricular ectopic activity consisted of 44952 beats (15.5% of total   beats), of which 81735 were single PVCs, 552 were ventricular couplets,   13689 were in bigeminy and 3567 were in trigeminy. There was no sustained   or nonsustained ventricular tachycardia. Supraventricular ectopic activity consisted of 74 beats minimal SVT. There   was no supraventricular tachycardia identified. There was no evidence of   atrial fibrillation or atrial flutter. There were no significant pauses. The longest R-R interval was  seconds. There was no evidence of advanced degree heart block. No symptom diary provided. Impression: Predominantly sinus, with an average heart rate of 76 beats per minute  Rare premature atrial contractions  No significant pauses or advanced degree heart block    High burden premature ventricular contractions at 15.5%. No runs of NSVT   of sustained VT.     Yolie Bourgeois MD

## 2023-12-22 ENCOUNTER — HOSPITAL ENCOUNTER (OUTPATIENT)
Dept: NON INVASIVE DIAGNOSTICS | Facility: HOSPITAL | Age: 53
Discharge: HOME | End: 2023-12-22
Attending: INTERNAL MEDICINE
Payer: COMMERCIAL

## 2023-12-22 VITALS — WEIGHT: 228 LBS | HEIGHT: 62 IN | BODY MASS INDEX: 41.96 KG/M2

## 2023-12-22 DIAGNOSIS — R00.2 PALPITATIONS: ICD-10-CM

## 2023-12-22 DIAGNOSIS — I49.3 FREQUENT PVCS: ICD-10-CM

## 2023-12-22 DIAGNOSIS — R00.1 BRADYCARDIA: ICD-10-CM

## 2023-12-22 LAB
ARRHY DURING EX: NORMAL
CHEST PAIN STATEMENT: NORMAL
MAX DIASTOLIC BP: 84 MMHG
MAX HR PERCENT: 85 %
MAX HR: 142 BPM
MAX PREDICTED HEART RATE: 167 BPM
PROTOCOL NAME: NORMAL
RATE PRESSURE PRODUCT: NORMAL
SL CV STRESS RECOVERY BP: NORMAL MMHG
SL CV STRESS RECOVERY HR: 94 BPM
SL CV STRESS RECOVERY O2 SAT: 99 %
SL CV STRESS STAGE REACHED: 2
STRESS ANGINA INDEX: 0
STRESS BASELINE BP: NORMAL MMHG
STRESS BASELINE HR: 75 BPM
STRESS O2 SAT REST: 98 %
STRESS PEAK HR: 142 BPM
STRESS POST ESTIMATED WORKLOAD: 7 METS
STRESS POST EXERCISE DUR MIN: 6 MIN
STRESS POST EXERCISE DUR MIN: 6 MIN
STRESS POST EXERCISE DUR SEC: 0 SEC
STRESS POST O2 SAT PEAK: 98 %
STRESS POST PEAK BP: 218 MMHG
STRESS POST PEAK HR: 142 BPM
STRESS POST PEAK SYSTOLIC BP: 218 MMHG
TARGET HR FORMULA: NORMAL
TEST INDICATION: NORMAL

## 2023-12-22 PROCEDURE — 93016 CV STRESS TEST SUPVJ ONLY: CPT | Performed by: INTERNAL MEDICINE

## 2023-12-22 PROCEDURE — 93018 CV STRESS TEST I&R ONLY: CPT | Performed by: INTERNAL MEDICINE

## 2023-12-22 PROCEDURE — 93017 CV STRESS TEST TRACING ONLY: CPT

## 2023-12-28 DIAGNOSIS — I49.3 FREQUENT PVCS: ICD-10-CM

## 2023-12-28 DIAGNOSIS — R00.2 PALPITATIONS: ICD-10-CM

## 2023-12-28 RX ORDER — METOPROLOL SUCCINATE 25 MG/1
25 TABLET, EXTENDED RELEASE ORAL DAILY
Qty: 90 TABLET | Refills: 1 | Status: SHIPPED | OUTPATIENT
Start: 2023-12-28 | End: 2023-12-29

## 2023-12-29 DIAGNOSIS — I49.3 FREQUENT PVCS: ICD-10-CM

## 2023-12-29 DIAGNOSIS — R00.2 PALPITATIONS: ICD-10-CM

## 2023-12-29 RX ORDER — METOPROLOL SUCCINATE 50 MG/1
50 TABLET, EXTENDED RELEASE ORAL DAILY
Qty: 90 TABLET | Refills: 1 | Status: SHIPPED | OUTPATIENT
Start: 2023-12-29

## 2024-03-12 NOTE — PROGRESS NOTES
A/P    1.  Annual exam    Last PAP - 3/8/23- neg neg   Next due 2028   Scheduling of pap discussed in detail     Mammogram - last  9/27/23 # 1 with density recommend more testing ABUS also ordered.    Next do 2024   Self breast exams discussed     Colonoscopy - 2/3/2015- x 10     2.  Cycles    Not regular and they are sometimes 3 weeks and sometimes 6 weeks   She bleeds heavy x 1 day then to nothing    She states last cycle about beginning of Feb     Precautions given and will let me know if heavy longer or close together       53 y.o.,No LMP recorded.  C/O no gyn concerns today       Past medical / social / surgical / family history reviewed and updated   Medication and allergies discussed in detail and updated     Review of Systems - History obtained from chart review and the patient  General ROS: negative  Psychological ROS: negative  Ophthalmic ROS: negative  ENT ROS: negative  Allergy and Immunology ROS: negative  Hematological and Lymphatic ROS: negative  Endocrine ROS: negative  Breast ROS: negative for breast lumps  Respiratory ROS: no cough, shortness of breath, or wheezing  Cardiovascular ROS: no chest pain or dyspnea on exertion  Gastrointestinal ROS: no abdominal pain, change in bowel habits, or black or bloody stools  Genito-Urinary ROS: no dysuria, trouble voiding, or hematuria  Musculoskeletal ROS: negative  Neurological ROS: no TIA or stroke symptoms  Dermatological ROS: negative        Physical Exam  Vitals reviewed. Exam conducted with a chaperone present.   Constitutional:       Appearance: She is well-developed.   Neck:      Thyroid: No thyromegaly.   Cardiovascular:      Rate and Rhythm: Normal rate.      Heart sounds: Normal heart sounds.   Pulmonary:      Effort: Pulmonary effort is normal. No accessory muscle usage or respiratory distress.      Breath sounds: Normal air entry.   Chest:      Chest wall: No tenderness.   Breasts:     Breasts are symmetrical.      Right: No inverted nipple, mass  or tenderness.      Left: No inverted nipple, mass or tenderness.   Abdominal:      General: There is no distension.      Palpations: Abdomen is soft.      Tenderness: There is no abdominal tenderness. There is no right CVA tenderness, left CVA tenderness, guarding or rebound.   Genitourinary:     General: Normal vulva.      Exam position: Lithotomy position.      Labia:         Right: No rash, tenderness, lesion or injury.         Left: No rash, tenderness, lesion or injury.       Vagina: Normal. No foreign body. No vaginal discharge or bleeding.      Cervix: Normal.      Uterus: Normal. Not enlarged and not fixed.       Adnexa: Right adnexa normal and left adnexa normal.        Right: No mass, tenderness or fullness.          Left: No mass, tenderness or fullness.        Rectum: No external hemorrhoid.   Musculoskeletal:      Cervical back: Normal range of motion.   Lymphadenopathy:      Cervical: No cervical adenopathy.      Upper Body:      Right upper body: No supraclavicular adenopathy.      Left upper body: No supraclavicular adenopathy.   Neurological:      Mental Status: She is alert and oriented to person, place, and time.   Psychiatric:         Speech: Speech normal.         Behavior: Behavior normal.         Thought Content: Thought content normal.         Judgment: Judgment normal.

## 2024-03-13 ENCOUNTER — ANNUAL EXAM (OUTPATIENT)
Dept: OBGYN CLINIC | Facility: MEDICAL CENTER | Age: 54
End: 2024-03-13
Payer: COMMERCIAL

## 2024-03-13 VITALS — HEIGHT: 62 IN | WEIGHT: 231.2 LBS | BODY MASS INDEX: 42.55 KG/M2

## 2024-03-13 DIAGNOSIS — Z12.31 SCREENING MAMMOGRAM FOR HIGH-RISK PATIENT: ICD-10-CM

## 2024-03-13 DIAGNOSIS — Z01.419 WOMEN'S ANNUAL ROUTINE GYNECOLOGICAL EXAMINATION: Primary | ICD-10-CM

## 2024-03-13 PROCEDURE — S0612 ANNUAL GYNECOLOGICAL EXAMINA: HCPCS | Performed by: OBSTETRICS & GYNECOLOGY

## 2024-05-02 ENCOUNTER — PATIENT MESSAGE (OUTPATIENT)
Dept: CARDIOLOGY CLINIC | Facility: CLINIC | Age: 54
End: 2024-05-02

## 2024-05-02 DIAGNOSIS — R00.2 PALPITATIONS: ICD-10-CM

## 2024-05-02 DIAGNOSIS — R00.1 BRADYCARDIA: ICD-10-CM

## 2024-05-02 DIAGNOSIS — I49.3 FREQUENT PVCS: Primary | ICD-10-CM

## 2024-05-28 DIAGNOSIS — I49.3 FREQUENT PVCS: ICD-10-CM

## 2024-05-28 DIAGNOSIS — R00.2 PALPITATIONS: ICD-10-CM

## 2024-05-28 RX ORDER — METOPROLOL SUCCINATE 25 MG/1
TABLET, EXTENDED RELEASE ORAL
Qty: 270 TABLET | Refills: 3 | Status: SHIPPED | OUTPATIENT
Start: 2024-05-28

## 2024-09-12 NOTE — ASSESSMENT & PLAN NOTE
"Endoscopy Scheduling Screen    Have you had any respiratory illness or flu-like symptoms in the last 10 days?  No    What is your communication preference for Instructions and/or Bowel Prep?   MyChart    What insurance is in the chart?  Other:      Ordering/Referring Provider:   VINCE GAMINO        (If ordering provider performs procedure, schedule with ordering provider unless otherwise instructed. )    BMI: Estimated body mass index is 32.57 kg/m  as calculated from the following:    Height as of 8/20/24: 1.816 m (5' 11.5\").    Weight as of 8/20/24: 107.4 kg (236 lb 12.8 oz).     Sedation Ordered  moderate sedation.   If patient BMI > 50 do not schedule in [facility].    If patient BMI > 45 do not schedule at [facility].    Are you taking methadone or Suboxone?  NO, No RN review required.    Have you been diagnosed and are being treated for severe PTSD or severe anxiety?  NO, No RN review required.    Are you taking any prescription medications for pain 3 or more times per week?   NO, No RN review required.    Do you have a history of malignant hyperthermia?  No    (Females) Are you currently pregnant?   No     Have you been diagnosed or told you have pulmonary hypertension?   No    Do you have an LVAD?  No    Have you been told you have moderate to severe sleep apnea?  No.    Have you been told you have COPD, asthma, or any other lung disease?  No    Do you have any heart conditions?  No     Have you ever had or are you waiting for an organ transplant?  No. Continue scheduling, no site restrictions.    Have you had a stroke or transient ischemic attack (TIA aka \"mini stroke\" in the last 6 months?   No    Have you been diagnosed with or been told you have cirrhosis of the liver?   No.    Are you currently on dialysis?   No    Do you need assistance transferring?   No    BMI: Estimated body mass index is 32.57 kg/m  as calculated from the following:    Height as of 8/20/24: 1.816 m (5' 11.5\").    Weight as of " Colonoscopy is up to date Patient declines covid flu and shingles Will up date adacel today Patient to have yearly mammogram I referred her to GYN as she needs pap 8/20/24: 107.4 kg (236 lb 12.8 oz).     Is patients BMI > 40 and scheduling location [facility]?  No    Do you take an injectable or oral medication for weight loss or diabetes (excluding insulin)?  No    Do you take the medication Naltrexone?  No    Do you take blood thinners?  No       Prep   Are you currently on dialysis or do you have chronic kidney disease?  No    Do you have a diagnosis of diabetes?  No    Do you have a diagnosis of cystic fibrosis (CF)?  No    On a regular basis do you go 3 -5 days between bowel movements?  No    BMI > 40?  No    Preferred Pharmacy:      [facility] - [place], [place] - [address]  [address] [facility] [address]  Phone: [phone] Fax: [phone]          Final Scheduling Details     Procedure scheduled  Colonoscopy    Surgeon:  Leonor     Date of procedure:  10/15     Pre-OP / PAC:   No - Not required for this site.    Location  [facility] - Patient preference.    Sedation   Moderate Sedation - Per order.      Patient Reminders:   You will receive a call from a Nurse to review instructions and health history.  This assessment must be completed prior to your procedure.  Failure to complete the Nurse assessment may result in the procedure being cancelled.      On the day of your procedure, please designate an adult(s) who can drive you home stay with you for the next 24 hours. The medicines used in the exam will make you sleepy. You will not be able to drive.      You cannot take public transportation, ride share services, or non-medical taxi service without a responsible caregiver.  Medical transport services are allowed with the requirement that a responsible caregiver will receive you at your destination.  We require that drivers and caregivers are confirmed prior to your procedure.

## 2025-03-12 RX ORDER — BACILLUS COAGULANS/INULIN 1B-250 MG
CAPSULE ORAL
COMMUNITY

## 2025-03-17 ENCOUNTER — TELEPHONE (OUTPATIENT)
Age: 55
End: 2025-03-17

## 2025-03-17 ENCOUNTER — OFFICE VISIT (OUTPATIENT)
Dept: OBGYN CLINIC | Facility: MEDICAL CENTER | Age: 55
End: 2025-03-17
Payer: COMMERCIAL

## 2025-03-17 VITALS
WEIGHT: 232 LBS | SYSTOLIC BLOOD PRESSURE: 112 MMHG | BODY MASS INDEX: 42.69 KG/M2 | DIASTOLIC BLOOD PRESSURE: 74 MMHG | HEIGHT: 62 IN

## 2025-03-17 DIAGNOSIS — E66.01 MORBID OBESITY (HCC): Primary | ICD-10-CM

## 2025-03-17 DIAGNOSIS — Z13.1 SCREENING FOR DIABETES MELLITUS: ICD-10-CM

## 2025-03-17 DIAGNOSIS — Z91.89 INCREASED RISK OF BREAST CANCER: ICD-10-CM

## 2025-03-17 DIAGNOSIS — Z13.29 SCREENING FOR THYROID DISORDER: ICD-10-CM

## 2025-03-17 DIAGNOSIS — Z80.3 FAMILY HISTORY OF BREAST CANCER IN MOTHER: ICD-10-CM

## 2025-03-17 DIAGNOSIS — Z01.419 WELL WOMAN EXAM WITH ROUTINE GYNECOLOGICAL EXAM: Primary | ICD-10-CM

## 2025-03-17 DIAGNOSIS — Z13.0 SCREENING FOR DEFICIENCY ANEMIA: ICD-10-CM

## 2025-03-17 DIAGNOSIS — Z12.11 SCREENING FOR COLON CANCER: ICD-10-CM

## 2025-03-17 DIAGNOSIS — Z00.00 ANNUAL PHYSICAL EXAM: ICD-10-CM

## 2025-03-17 DIAGNOSIS — Z12.31 BREAST CANCER SCREENING BY MAMMOGRAM: ICD-10-CM

## 2025-03-17 DIAGNOSIS — Z13.220 SCREENING, LIPID: ICD-10-CM

## 2025-03-17 PROCEDURE — S0612 ANNUAL GYNECOLOGICAL EXAMINA: HCPCS | Performed by: OBSTETRICS & GYNECOLOGY

## 2025-03-17 PROCEDURE — G0476 HPV COMBO ASSAY CA SCREEN: HCPCS | Performed by: OBSTETRICS & GYNECOLOGY

## 2025-03-17 PROCEDURE — G0145 SCR C/V CYTO,THINLAYER,RESCR: HCPCS | Performed by: OBSTETRICS & GYNECOLOGY

## 2025-03-17 NOTE — TELEPHONE ENCOUNTER
Patient has a Physical scheduled on 5/16/25 and would like to get labs done prior to the appointment. Patient would like a callback when ready so she can have labs completed.

## 2025-03-17 NOTE — PROGRESS NOTES
Assessment   54 y.o.  presenting for annual exam.     Plan     Diagnoses and all orders for this visit:    Well woman exam with routine gynecological exam  -     Liquid-based pap, screening  -     HPV High Risk  - Mammo/ABUS ordered  - Cologuard ordered  - Return in 1yr for yearly    Breast cancer screening by mammogram  Increased risk of breast cancer  Family history of breast cancer in mother  -     US breast screening bilateral complete (ABUS); Future  -     Mammo screening bilateral w 3d and cad; Future    Screening for colon cancer  -     Cologuard      __________________________________________________________________      Subjective     Susanne Godoy is a 54 y.o.  with regular menses who is sexually active and currently not using contraception presenting for annual exam. She is without complaint.    GYN  Complaints: denies  Denies dysmenorrhea, dyspareunia, genital discharge, genital ulcers, pelvic pain, and vulvar/vaginal symptoms  Periods are regular every 28-30 days, lasting 6 days.  Dysmenorrhea:moderate, occurring first 1-2 days of flow.   Cyclic symptoms include none  Sexually active: Yes - single partner - male  Hx STI: denies   Hx Abnormal pap: denies  Last pap:  - nilm, hpv neg    OB   ( x2)  Pregnancy complications: denies, 9lb 15oz baby though!  Had epis      Complaints: rare CHARLENE  Denies urinary frequency, hematuria, and dysuria    BREAST  Complaints: denies  Denies: breast lump, breast tenderness, changed mole, dryness, nipple discharge, pruritus, rash, skin color change, and skin lesion(s)  Last mammogram:  - birads1  Personal hx: n/a  Family hx: denies fhx of uterine, ovarian, or colon cancers  Mother with breast ca, psb also cousin (1st, maternal)  Patient does do regular self-exams    GENERAL  PMH reviewed/updated and is as below.   Patient does follow with a PCP.  Works as a , [employer].  Denies domestic violence.    SCREENING  Cervical Ca: pap  collected  Breast Ca: mammo/abus ordered  Colon Ca: cologuard ordered      Past Medical History:   Diagnosis Date    Abnormal weight loss     Resolved 7/15/2015     Acrochordon     Right axillary x2. Resolved 7/15/2015     Former smoker     Gastroparesis     Resolved 2/1/2017     Neoplasm of skin of back     Resolved 9/12/2014     Walking pneumonia     Resolved 2/1/2017        Past Surgical History:   Procedure Laterality Date    ARTHROSCOPY KNEE      COLONOSCOPY  02/03/2015    ELBOW BURSA SURGERY      ESOPHAGOGASTRODUODENOSCOPY  10/20/2014    Diagnostic     SHOULDER ARTHROSCOPY      THORACIC OUTLET SURGERY      TUBAL LIGATION      WRIST SURGERY Right          Current Outpatient Medications:     Bacillus Coagulans-Inulin (Probiotic) 1-250 BILLION-MG CAPS, , Disp: , Rfl:     Calcium 200 MG TABS, , Disp: , Rfl:     Cholecalciferol 50 MCG (2000 UT) CAPS, Take 1 capsule by mouth daily, Disp: , Rfl:     EPINEPHrine (EPIPEN) 0.3 mg/0.3 mL SOAJ, Inject 0.3 mL (0.3 mg total) into a muscle once for 1 dose, Disp: 2 each, Rfl: 0    ibuprofen (MOTRIN) 600 mg tablet, , Disp: , Rfl:     metoprolol succinate (TOPROL-XL) 25 mg 24 hr tablet, Take 2 tablets (50 mg total) by mouth every morning AND 1 tablet (25 mg total) every evening., Disp: 270 tablet, Rfl: 3    Multiple Vitamins-Minerals (IMMUNE ESSENTIALS DAILY PO), , Disp: , Rfl:     NON FORMULARY, Take 1 tablet by mouth every evening VIT C/OLIVE LEAF EXT/BETA-GLUC (IMMUNE ESSENTIALS ORAL), Disp: , Rfl:     NON FORMULARY, Take 1 capsule by mouth daily LACTOBACILLUS ACIDOPHILUS (PROBIOTIC ORAL), Disp: , Rfl:     NON FORMULARY, Take 1 tablet by mouth 2 (two) times a day CALCIUM CARBONATE/VITAMIN D3 (CALCIUM 500 + D ORAL), Disp: , Rfl:     Omega 3 340 MG CPDR, , Disp: , Rfl:     Pediatric Multivitamins-Fl (MultiVitamin + Fluoride) 0.25 MG CHEW, , Disp: , Rfl:     Allergies   Allergen Reactions    Honey Bee Venom Protein  [Bee Venom] Hives    Tobramycin Other (See Comments)      "Bubble on eye         Social History     Tobacco Use    Smoking status: Former     Current packs/day: 0.00     Average packs/day: 0.5 packs/day for 10.0 years (5.0 ttl pk-yrs)     Types: Cigarettes, Cigars     Start date: 1996     Quit date: 2006     Years since quittin.1     Passive exposure: Never    Smokeless tobacco: Never   Vaping Use    Vaping status: Never Used   Substance Use Topics    Alcohol use: Yes     Comment: A glass of red wine w/dinner    Drug use: Never     Comment: Denied: History of drug use - As per [employer]              Objective  /74   Ht 5' 2\" (1.575 m)   Wt 105 kg (232 lb)   LMP 2025 (Approximate)   BMI 42.43 kg/m²      Physical Exam:  Physical Exam  Exam conducted with a chaperone present.   Constitutional:       General: She is not in acute distress.     Appearance: Normal appearance. She is well-developed. She is not ill-appearing, toxic-appearing or diaphoretic.   HENT:      Head: Normocephalic and atraumatic.   Eyes:      General: No scleral icterus.        Right eye: No discharge.         Left eye: No discharge.      Conjunctiva/sclera: Conjunctivae normal.   Cardiovascular:      Rate and Rhythm: Normal rate.   Pulmonary:      Effort: Pulmonary effort is normal. No accessory muscle usage or respiratory distress.   Chest:   Breasts:     Breasts are symmetrical.      Right: No inverted nipple, mass, nipple discharge, skin change or tenderness.      Left: No inverted nipple, mass, nipple discharge, skin change or tenderness.   Abdominal:      General: There is no distension.      Palpations: Abdomen is soft. There is no mass.      Tenderness: There is no abdominal tenderness. There is no guarding or rebound.   Genitourinary:     General: Normal vulva.      Exam position: Lithotomy position.      Labia:         Right: No rash, tenderness or lesion.         Left: No rash, tenderness or lesion.       Urethra: No prolapse, urethral swelling or urethral lesion.    "   Vagina: No signs of injury. No vaginal discharge, erythema, tenderness, bleeding or lesions.      Cervix: No cervical motion tenderness, discharge, friability, lesion or erythema.      Uterus: Not enlarged, not fixed and not tender.       Adnexa:         Right: No mass, tenderness or fullness.          Left: No mass, tenderness or fullness.        Rectum: No external hemorrhoid. Normal anal tone.   Lymphadenopathy:      Upper Body:      Right upper body: No axillary or pectoral adenopathy.      Left upper body: No axillary or pectoral adenopathy.   Skin:     General: Skin is warm and dry.      Findings: No erythema or rash.   Neurological:      Mental Status: She is alert.   Psychiatric:         Mood and Affect: Mood normal.         Behavior: Behavior normal.         Thought Content: Thought content normal.         Judgment: Judgment normal.

## 2025-03-21 LAB
LAB AP GYN PRIMARY INTERPRETATION: NORMAL
Lab: NORMAL

## 2025-03-26 ENCOUNTER — RESULTS FOLLOW-UP (OUTPATIENT)
Dept: OBGYN CLINIC | Facility: MEDICAL CENTER | Age: 55
End: 2025-03-26

## 2025-04-04 LAB — COLOGUARD RESULT REPORTABLE: NEGATIVE

## 2025-04-07 DIAGNOSIS — R00.2 PALPITATIONS: ICD-10-CM

## 2025-04-07 DIAGNOSIS — I49.3 FREQUENT PVCS: ICD-10-CM

## 2025-04-07 RX ORDER — METOPROLOL SUCCINATE 25 MG/1
TABLET, EXTENDED RELEASE ORAL
Qty: 270 TABLET | Refills: 0 | Status: SHIPPED | OUTPATIENT
Start: 2025-04-07

## 2025-06-13 ENCOUNTER — RA CDI HCC (OUTPATIENT)
Dept: OTHER | Facility: HOSPITAL | Age: 55
End: 2025-06-13

## 2025-06-13 NOTE — PROGRESS NOTES
Please review if this dx is applicable to the patient's condition and assess and document, if applicable in next visit        [id number]    HCC coding opportunities          Chart Reviewed number of suggestions sent to Provider: 1     Patients Insurance        Commercial Insurance: [employer]

## 2025-06-17 ENCOUNTER — APPOINTMENT (OUTPATIENT)
Dept: LAB | Facility: CLINIC | Age: 55
End: 2025-06-17
Payer: COMMERCIAL

## 2025-06-17 ENCOUNTER — RESULTS FOLLOW-UP (OUTPATIENT)
Dept: FAMILY MEDICINE CLINIC | Facility: CLINIC | Age: 55
End: 2025-06-17

## 2025-06-17 DIAGNOSIS — Z13.0 SCREENING FOR DEFICIENCY ANEMIA: ICD-10-CM

## 2025-06-17 DIAGNOSIS — Z00.00 ANNUAL PHYSICAL EXAM: ICD-10-CM

## 2025-06-17 DIAGNOSIS — Z13.1 SCREENING FOR DIABETES MELLITUS: ICD-10-CM

## 2025-06-17 DIAGNOSIS — Z13.29 SCREENING FOR THYROID DISORDER: ICD-10-CM

## 2025-06-17 DIAGNOSIS — Z13.220 SCREENING, LIPID: ICD-10-CM

## 2025-06-17 DIAGNOSIS — E66.01 MORBID OBESITY (HCC): ICD-10-CM

## 2025-06-17 LAB
ALBUMIN SERPL BCG-MCNC: 4.2 G/DL (ref 3.5–5)
ALP SERPL-CCNC: 50 U/L (ref 34–104)
ALT SERPL W P-5'-P-CCNC: 18 U/L (ref 7–52)
ANION GAP SERPL CALCULATED.3IONS-SCNC: 8 MMOL/L (ref 4–13)
AST SERPL W P-5'-P-CCNC: 27 U/L (ref 13–39)
BASOPHILS # BLD AUTO: 0.09 THOUSANDS/ÂΜL (ref 0–0.1)
BASOPHILS NFR BLD AUTO: 1 % (ref 0–1)
BILIRUB SERPL-MCNC: 0.45 MG/DL (ref 0.2–1)
BUN SERPL-MCNC: 19 MG/DL (ref 5–25)
CALCIUM SERPL-MCNC: 9.2 MG/DL (ref 8.4–10.2)
CHLORIDE SERPL-SCNC: 102 MMOL/L (ref 96–108)
CHOLEST SERPL-MCNC: 190 MG/DL (ref ?–200)
CO2 SERPL-SCNC: 25 MMOL/L (ref 21–32)
CREAT SERPL-MCNC: 1.05 MG/DL (ref 0.6–1.3)
EOSINOPHIL # BLD AUTO: 0.75 THOUSAND/ÂΜL (ref 0–0.61)
EOSINOPHIL NFR BLD AUTO: 9 % (ref 0–6)
ERYTHROCYTE [DISTWIDTH] IN BLOOD BY AUTOMATED COUNT: 14 % (ref 11.6–15.1)
EST. AVERAGE GLUCOSE BLD GHB EST-MCNC: 108 MG/DL
GFR SERPL CREATININE-BSD FRML MDRD: 60 ML/MIN/1.73SQ M
GLUCOSE P FAST SERPL-MCNC: 92 MG/DL (ref 65–99)
HBA1C MFR BLD: 5.4 %
HCT VFR BLD AUTO: 40.7 % (ref 34.8–46.1)
HDLC SERPL-MCNC: 56 MG/DL
HGB BLD-MCNC: 12.9 G/DL (ref 11.5–15.4)
IMM GRANULOCYTES # BLD AUTO: 0.02 THOUSAND/UL (ref 0–0.2)
IMM GRANULOCYTES NFR BLD AUTO: 0 % (ref 0–2)
LDLC SERPL CALC-MCNC: 122 MG/DL (ref 0–100)
LYMPHOCYTES # BLD AUTO: 2.42 THOUSANDS/ÂΜL (ref 0.6–4.47)
LYMPHOCYTES NFR BLD AUTO: 28 % (ref 14–44)
MCH RBC QN AUTO: 29 PG (ref 26.8–34.3)
MCHC RBC AUTO-ENTMCNC: 31.7 G/DL (ref 31.4–37.4)
MCV RBC AUTO: 92 FL (ref 82–98)
MONOCYTES # BLD AUTO: 0.63 THOUSAND/ÂΜL (ref 0.17–1.22)
MONOCYTES NFR BLD AUTO: 7 % (ref 4–12)
NEUTROPHILS # BLD AUTO: 4.65 THOUSANDS/ÂΜL (ref 1.85–7.62)
NEUTS SEG NFR BLD AUTO: 55 % (ref 43–75)
NONHDLC SERPL-MCNC: 134 MG/DL
NRBC BLD AUTO-RTO: 0 /100 WBCS
PLATELET # BLD AUTO: 332 THOUSANDS/UL (ref 149–390)
PMV BLD AUTO: 11.6 FL (ref 8.9–12.7)
POTASSIUM SERPL-SCNC: 4.8 MMOL/L (ref 3.5–5.3)
PROT SERPL-MCNC: 7 G/DL (ref 6.4–8.4)
RBC # BLD AUTO: 4.45 MILLION/UL (ref 3.81–5.12)
SODIUM SERPL-SCNC: 135 MMOL/L (ref 135–147)
TRIGL SERPL-MCNC: 61 MG/DL (ref ?–150)
TSH SERPL DL<=0.05 MIU/L-ACNC: 4.29 UIU/ML (ref 0.45–4.5)
WBC # BLD AUTO: 8.56 THOUSAND/UL (ref 4.31–10.16)

## 2025-06-17 PROCEDURE — 83036 HEMOGLOBIN GLYCOSYLATED A1C: CPT

## 2025-06-17 PROCEDURE — 36415 COLL VENOUS BLD VENIPUNCTURE: CPT

## 2025-06-17 PROCEDURE — 80061 LIPID PANEL: CPT

## 2025-06-17 PROCEDURE — 80053 COMPREHEN METABOLIC PANEL: CPT

## 2025-06-17 PROCEDURE — 85025 COMPLETE CBC W/AUTO DIFF WBC: CPT

## 2025-06-17 PROCEDURE — 84443 ASSAY THYROID STIM HORMONE: CPT

## 2025-06-23 ENCOUNTER — OFFICE VISIT (OUTPATIENT)
Dept: CARDIOLOGY CLINIC | Facility: CLINIC | Age: 55
End: 2025-06-23
Payer: COMMERCIAL

## 2025-06-23 VITALS
DIASTOLIC BLOOD PRESSURE: 76 MMHG | BODY MASS INDEX: 43.3 KG/M2 | HEIGHT: 62 IN | WEIGHT: 235.3 LBS | SYSTOLIC BLOOD PRESSURE: 118 MMHG

## 2025-06-23 DIAGNOSIS — E66.01 MORBID OBESITY (HCC): ICD-10-CM

## 2025-06-23 DIAGNOSIS — R00.2 PALPITATIONS: ICD-10-CM

## 2025-06-23 DIAGNOSIS — I49.3 FREQUENT PVCS: Primary | ICD-10-CM

## 2025-06-23 LAB
ATRIAL RATE: 66 BPM
P AXIS: 61 DEGREES
PR INTERVAL: 156 MS
QRS AXIS: 40 DEGREES
QRSD INTERVAL: 84 MS
QT INTERVAL: 396 MS
QTC INTERVAL: 415 MS
T WAVE AXIS: 17 DEGREES
VENTRICULAR RATE: 66 BPM

## 2025-06-23 PROCEDURE — 99214 OFFICE O/P EST MOD 30 MIN: CPT | Performed by: INTERNAL MEDICINE

## 2025-06-23 PROCEDURE — 93000 ELECTROCARDIOGRAM COMPLETE: CPT | Performed by: INTERNAL MEDICINE

## 2025-06-23 NOTE — PROGRESS NOTES
[facility] [place]   [address]  [place] [address]                                            Cardiology Office Consult  Susanne Godoy, 54 y.o. female  YOB: 1970  MRN: [id number] Encounter: [id number]      PCP - Antoinette Mattson DO  Referring Provider - No ref. provider found    Chief Complaint   Patient presents with   • Follow-up     Having more issues when stressing       Assessment  Frequent PVCs  TTE 11/17/2023 -LVEF 55%, aortic sclerosis, mild AI/MR/TR  Bradycardia  Palpitations  Aortic sclerosis  Meningioma  Gets annual MRI  Morbid Obesity, Body mass index is 43.04 kg/m².  JENNI    Plan  Bradycardia, Frequent PVCs, Palpitations, Hypertension  Overview  11/2/23 ECG  - NSR with frequent PVCs  11/3/23 initial OV with me - I explained to her that the bradycardia is related to the compensatory pauses, ectopic beats  She has no dizziness or lightheadedness, or any other evidence or suggestion she may have high-grade AV block or sinus pauses  11/20/23: Holter - frequent PVCs 15.5%, no NSVT / VT  Started on metoprolol succinate 25 mg daily  12/13/23 - reports feeling much better with palpitations / PVCs having subsided. No fatigue / dizziness. She does still occasionally notice the irregular heart beat  Stress test - 6:00 min, 85% MPHR, stress ECG negative for ischemia  6/2025: no palpitations, doing well, no significant ectopy on clinical exam or ECG today  Risk factors  Stress  Excess caffeine intake --> minimize caffeine  ?JENNI --> home sleep study 11/2023 was negative for any significant JENNI, lowest SpO2 83%  Plan  Continue current dose of metoprolol succinate 50/25 mg daily   Limit caffeine/alcohol intake  Increase cardio exercises with walking/elliptical or bike  Weight loss recommended  Monitor for dizziness/low blood pressures with weight loss    Morbid obesity, Body mass index is 43.04 kg/m².     Cholesterol levels remain borderline,    Diet  modifications, increase cardio exercises as able  She works 60-hour work-weeks, and does remote work from home since 2018, and has very limited cardio exercises at present  Again counseled regarding need to increase his cardio exercises and find some time on weekends, cut down on carbohydrate intake, avoid snacking in between meals/using high-fiber low calorie snacks  She is considering looking into alternative options to help with weight loss and is going to discuss with primary care follow-up      Results for orders placed or performed in visit on 25   POCT ECG   Result Value    Ventricular Rate 66    Atrial Rate 66    IA Interval 156    QRSD Interval 84    QT Interval 396    QTC Interval 415    P Axis 61    QRS Axis 40    T Wave Axis 17    Narrative    Normal sinus rhythm  Normal ECG  When compared with ECG of 03-Dec-2014 10:52,  No significant change was found  Confirmed by Arun Rodriguez ([id number]) on 2025 8:42:43 AM       Orders Placed This Encounter   Procedures   • POCT ECG     Return in about 1 year (around 2026), or if symptoms worsen or fail to improve.      History of Present Illness   54 y.o. female , , comes in as a new patient for consultation regarding recent episodes of bradycardia.    Over the last 3 months, she has been dealing with issues with left knee pain and has been getting physical therapy.  She has been under increased stress.  Factors for the same.  During this, she started to notice that her heart rate has been dropping down to the 40s at times on her Fitbit.  She subsequently got an iWatch SE and has continued to monitor her heart rate on the same.  In addition, she has had intermittent symptoms of palpitations occurring as well.  No complaints of dizziness or lightheadedness, near-syncope or syncope.    No known prior history of CAD or heart failure.    Family history  Father -  of blood cancer/COVID at 71. No heart problems  Mother - alive at 74.  breast cancer in remission. No known heart problems.  Siblings: 1 younger brother - no medical problems    Interval history - 12/13/2023  She returns for follow-up after about 6 weeks.  In the interim she completed the Holter monitor and this showed frequent PVCs.  I started on metoprolol after this, and she has felt her PVCs have subsided and had irregular heartbeats no longer occurring as much.  No complaints of fatigue, dizziness or lightheadedness, near-syncope or syncope    Interval history - 6/23/2025  She returns for follow-up after about 1.5 years.  In the interim, she remains free of chest pain, shortness of breath, palpitations and has overall been doing well.  She continues to be sedentary, mostly working from her home desk for 60 hours a week.      Historical Information   Past Medical History:   Diagnosis Date   • Abnormal weight loss     Resolved 7/15/2015    • Acrochordon     Right axillary x2. Resolved 7/15/2015    • Former smoker    • Gastroparesis     Resolved 2/1/2017    • Neoplasm of skin of back     Resolved 9/12/2014    • Walking pneumonia     Resolved 2/1/2017      Past Surgical History:   Procedure Laterality Date   • ARTHROSCOPY KNEE     • COLONOSCOPY  02/03/2015   • ELBOW BURSA SURGERY     • ESOPHAGOGASTRODUODENOSCOPY  10/20/2014    Diagnostic    • SHOULDER ARTHROSCOPY     • THORACIC OUTLET SURGERY     • TUBAL LIGATION     • WRIST SURGERY Right      Family History   Problem Relation Name Age of Onset   • Breast cancer Mother Simran    • Hypertension Father Ed    • Cancer Father Ed    • No Known Problems Brother     • No Known Problems Maternal Grandmother     • No Known Problems Maternal Grandfather     • Hypertension Paternal Grandmother Grandma M    • Dementia Paternal Grandmother Grandma M    • No Known Problems Paternal Grandfather       Current Outpatient Medications on File Prior to Visit   Medication Sig Dispense Refill   • Bacillus Coagulans-Inulin (Probiotic) 1-250 BILLION-MG CAPS       • Calcium 200 MG TABS      • Cholecalciferol 50 MCG (2000 UT) CAPS Take 1 capsule by mouth in the morning.     • ibuprofen (MOTRIN) 600 mg tablet      • metoprolol succinate (TOPROL-XL) 25 mg 24 hr tablet Take 2 tablets (50 mg total) by mouth every morning AND 1 tablet (25 mg total) every evening. 270 tablet 0   • Multiple Vitamins-Minerals (IMMUNE ESSENTIALS DAILY PO)      • NON FORMULARY Take 1 tablet by mouth every evening VIT C/OLIVE LEAF EXT/BETA-GLUC (IMMUNE ESSENTIALS ORAL)     • NON FORMULARY Take 1 capsule by mouth in the morning. LACTOBACILLUS ACIDOPHILUS (PROBIOTIC ORAL).     • NON FORMULARY Take 1 tablet by mouth in the morning and 1 tablet in the evening. CALCIUM CARBONATE/VITAMIN D3 (CALCIUM 500 + D ORAL).     • Omega 3 340 MG CPDR      • EPINEPHrine (EPIPEN) 0.3 mg/0.3 mL SOAJ Inject 0.3 mL (0.3 mg total) into a muscle once for 1 dose 2 each 0   • Pediatric Multivitamins-Fl (MultiVitamin + Fluoride) 0.25 MG CHEW        No current facility-administered medications on file prior to visit.     Allergies   Allergen Reactions   • Honey Bee Venom Protein  [Bee Venom] Hives   • Tobramycin Other (See Comments)     Bubble on eye       Social History     Socioeconomic History   • Marital status: /Civil Union   Tobacco Use   • Smoking status: Former     Current packs/day: 0.00     Average packs/day: 0.5 packs/day for 10.0 years (5.0 ttl pk-yrs)     Types: Cigarettes, Cigars     Start date: 1996     Quit date: 2006     Years since quittin.4     Passive exposure: Never   • Smokeless tobacco: Never   Vaping Use   • Vaping status: Never Used   Substance and Sexual Activity   • Alcohol use: Yes     Comment: A glass of red wine w/dinner   • Drug use: Never     Comment: Denied: History of drug use - As per [employer]    • Sexual activity: Yes     Partners: Male     Birth control/protection: Female Sterilization     Comment: With my  I’ve been with since         Review of Systems   All  "other systems reviewed and are negative.      Vitals:  Vitals:    06/23/25 0812   BP: 118/76   BP Location: Left arm   Patient Position: Sitting   Cuff Size: Large   Weight: 107 kg (235 lb 4.8 oz)   Height: 5' 2\" (1.575 m)     BMI - Body mass index is 43.04 kg/m².  Wt Readings from Last 7 Encounters:   06/23/25 107 kg (235 lb 4.8 oz)   03/17/25 105 kg (232 lb)   03/13/24 105 kg (231 lb 3.2 oz)   12/22/23 103 kg (228 lb)   12/13/23 103 kg (228 lb)   11/17/23 103 kg (228 lb)   11/03/23 103 kg (228 lb)       Physical Exam  Vitals and nursing note reviewed.   Constitutional:       General: She is not in acute distress.     Appearance: Normal appearance. She is well-developed. She is obese. She is not ill-appearing.   HENT:      Head: Normocephalic and atraumatic.      Nose: No congestion.     Eyes:      General: No scleral icterus.     Conjunctiva/sclera: Conjunctivae normal.     Neck:      Vascular: No carotid bruit or JVD.     Cardiovascular:      Rate and Rhythm: Normal rate and regular rhythm. Occasional Extrasystoles are present.     Pulses: Normal pulses.      Heart sounds: Normal heart sounds. No murmur heard.     No friction rub. No gallop.   Pulmonary:      Effort: Pulmonary effort is normal. No respiratory distress.      Breath sounds: Normal breath sounds. No rales.   Abdominal:      General: There is no distension.      Palpations: Abdomen is soft.      Tenderness: There is no abdominal tenderness.     Musculoskeletal:         General: No swelling or tenderness.      Cervical back: Neck supple.      Right lower leg: No edema.      Left lower leg: No edema.      Comments: Left knee brace noted in place     Skin:     General: Skin is warm.     Neurological:      General: No focal deficit present.      Mental Status: She is alert and oriented to person, place, and time. Mental status is at baseline.     Psychiatric:         Mood and Affect: Mood normal.         Behavior: Behavior normal.         Thought " "Content: Thought content normal.         Labs:  CBC:   Lab Results   Component Value Date    WBC 8.56 06/17/2025    RBC 4.45 06/17/2025    HGB 12.9 06/17/2025    HCT 40.7 06/17/2025    MCV 92 06/17/2025     06/17/2025    RDW 14.0 06/17/2025       CMP:   Lab Results   Component Value Date     11/19/2014    K 4.8 06/17/2025     06/17/2025    CO2 25 06/17/2025    ANIONGAP 8 11/19/2014    BUN 19 06/17/2025    CREATININE 1.05 06/17/2025    EGFR 60 06/17/2025    GLUCOSE 91 11/19/2014    CALCIUM 9.2 06/17/2025    AST 27 06/17/2025    ALT 18 06/17/2025    ALKPHOS 50 06/17/2025    PROT 7.8 11/19/2014    BILITOT 0.2 11/19/2014       Magnesium:  No results found for: \"MG\"    Lipid Profile:   Lab Results   Component Value Date    HDL 56 06/17/2025    TRIG 61 06/17/2025    LDLCALC 122 (H) 06/17/2025       Thyroid Studies:   Lab Results   Component Value Date    VMF4CUBEJGCF 4.292 06/17/2025    FREET4 0.89 11/03/2023       A1c:  No components found for: \"HGA1C\"    INR:  No results found for: \"INR\"5    Imaging: No results found.    Cardiac testing:   No results found for this or any previous visit.    No results found for this or any previous visit.    No results found for this or any previous visit.    No results found for this or any previous visit.      Stress test only, exercise  •  Stress ECG: The stress ECG is negative for ischemia after maximal   exercise, without reproduction of symptoms.  •  Stress ECG: A Andre protocol stress test was performed. The patient   reached stage 2.0of the protocol after exercising for 6 min and had a   maximal HR of 142 bpm (85% of MPHR) and 7.0 METS. The patient experienced   no angina during the test. Blood pressure demonstrated a hypertensive   response and heart rate demonstrated a normal response to stress.  •  Occasional PVCs and rest, improved with stress and recurring in   recovery.  Stress strip  Confirmed by KASSANDRA DAVISON ([id number]),  Umair Morgan ([id number]) " on 12/22/2023 10:46:47 AM

## 2025-06-30 ENCOUNTER — OFFICE VISIT (OUTPATIENT)
Dept: FAMILY MEDICINE CLINIC | Facility: CLINIC | Age: 55
End: 2025-06-30
Payer: COMMERCIAL

## 2025-06-30 VITALS
WEIGHT: 240 LBS | BODY MASS INDEX: 44.16 KG/M2 | TEMPERATURE: 97.1 F | OXYGEN SATURATION: 97 % | HEIGHT: 62 IN | DIASTOLIC BLOOD PRESSURE: 90 MMHG | HEART RATE: 86 BPM | SYSTOLIC BLOOD PRESSURE: 120 MMHG

## 2025-06-30 DIAGNOSIS — Z00.00 ANNUAL PHYSICAL EXAM: Primary | ICD-10-CM

## 2025-06-30 DIAGNOSIS — E66.01 MORBID OBESITY WITH BMI OF 40.0-44.9, ADULT (HCC): ICD-10-CM

## 2025-06-30 DIAGNOSIS — K58.0 IRRITABLE BOWEL SYNDROME WITH DIARRHEA: ICD-10-CM

## 2025-06-30 DIAGNOSIS — R00.2 PALPITATIONS: ICD-10-CM

## 2025-06-30 DIAGNOSIS — D32.9 MENINGIOMA (HCC): ICD-10-CM

## 2025-06-30 DIAGNOSIS — Z23 ENCOUNTER FOR IMMUNIZATION: ICD-10-CM

## 2025-06-30 PROBLEM — G47.33 OBSTRUCTIVE SLEEP APNEA: Status: RESOLVED | Noted: 2023-11-20 | Resolved: 2025-06-30

## 2025-06-30 PROBLEM — G47.8 UNREFRESHED BY SLEEP: Status: RESOLVED | Noted: 2023-11-02 | Resolved: 2025-06-30

## 2025-06-30 PROCEDURE — 90750 HZV VACC RECOMBINANT IM: CPT

## 2025-06-30 PROCEDURE — 90471 IMMUNIZATION ADMIN: CPT

## 2025-06-30 PROCEDURE — 99396 PREV VISIT EST AGE 40-64: CPT | Performed by: FAMILY MEDICINE

## 2025-06-30 NOTE — ASSESSMENT & PLAN NOTE
Food symptom diary Patient try FODMAP and see GI  Orders:    Ambulatory Referral to Gastroenterology; Future

## 2025-06-30 NOTE — ASSESSMENT & PLAN NOTE
{If prescribing weight loss medication, click here to fill out prior auth smartform and then hit F2 with this smartlist to insert prior auth documentation (Optional):[id number]}    Orders:    Ambulatory Referral to Weight Management; Future

## 2025-06-30 NOTE — ASSESSMENT & PLAN NOTE
Patient to continue with diet Patient to have yearly labs She will have shingles shot now Patient to work on advanced directives Patient to see dentist eye doctor and GYN

## 2025-06-30 NOTE — PROGRESS NOTES
"Adult Annual Physical  Name: Susanne Godoy      : 1970      MRN: [id number]  Encounter Provider: Antoinette Mattson DO  Encounter Date: 2025   Encounter department: [facility]    :  Assessment & Plan  Annual physical exam  Patient to continue with diet Patient to have yearly labs She will have shingles shot now Patient to work on advanced directives Patient to see dentist eye doctor and GYN       Meningioma ([facility])  stable       Palpitations  Reviewed cardiology visit continue with metoprolol       Morbid obesity with BMI of 40.0-44.9, adult ([facility])      Orders:    Ambulatory Referral to Weight Management; Future    Irritable bowel syndrome with diarrhea  Food symptom diary Patient try FODMAP and see GI  Orders:    Ambulatory Referral to Gastroenterology; Future    Encounter for immunization    Orders:    Zoster Vaccine Recombinant IM        Preventive Screenings:    - Cervical cancer screening: screening up-to-date   - Breast cancer screening: screening up-to-date     Immunizations:  - Immunizations due: Prevnar 20         History of Present Illness     Adult Annual Physical:  Patient presents for annual physical. Patient is here for annual PE She is doing arm presses and stationary rowing machine She is having a lot of issues still with her stomach She will have \"dumping\" Patient will eat and then immediately rush to the bathroom to have a bowel movement She was told in the past gastroparesis However she has not had colonoscopy She is interested in the weight loss .     Diet and Physical Activity:  - Diet/Nutrition: well balanced diet, limited junk food and low fat diet. Adding more protein. Would like to discuss additional options for weight loss.  - Exercise: 3-4 times a week on average. Squat row machine & chair push-ups. Need to add more frequent movement throughout the day.    Depression Screening:  - PHQ-2 Score: 0    General Health:  - Sleep: 7-8 hours of sleep " on average. Sleep with a mouth guard - clench teeth at night. Getting new one made at my dentist July 15th that should be better than the one I currently use  - Hearing: normal hearing bilateral ears.  - Vision: most recent eye exam < 1 year ago and wears glasses.  - Dental: regular dental visits and brushes teeth twice daily.    /GYN Health:  - Follows with GYN: yes.   - Menopause: perimenopausal.   - Last menstrual cycle: 6/24/2025.   - History of STDs: no  - Contraception: tubal ligation.      Advanced Care Planning:  - Has an advanced directive?: no    - Has a durable medical POA?: no    - ACP document given to patient?: yes      Review of Systems   Constitutional:  Negative for fatigue, fever and unexpected weight change.   HENT:  Negative for congestion, sinus pain and trouble swallowing.    Eyes:  Negative for discharge and visual disturbance.   Respiratory:  Negative for cough, chest tightness, shortness of breath and wheezing.    Cardiovascular:  Negative for chest pain, palpitations and leg swelling.   Gastrointestinal:  Negative for abdominal pain, blood in stool, constipation, diarrhea, nausea and vomiting.   Genitourinary:  Negative for difficulty urinating, dysuria, frequency and hematuria.   Musculoskeletal:  Negative for arthralgias, gait problem and joint swelling.   Skin:  Negative for rash and wound.   Allergic/Immunologic: Negative for environmental allergies and food allergies.   Neurological:  Negative for dizziness, syncope, weakness, numbness and headaches.   Hematological:  Negative for adenopathy. Does not bruise/bleed easily.   Psychiatric/Behavioral:  Negative for confusion, decreased concentration and sleep disturbance. The patient is not nervous/anxious.          Objective   LMP 06/24/2025     Physical Exam  Vitals and nursing note reviewed.   Constitutional:       Appearance: She is well-developed.   HENT:      Head: Normocephalic and atraumatic.      Right Ear: Hearing, tympanic  membrane and external ear normal.      Left Ear: Hearing, tympanic membrane and external ear normal.     Eyes:      Conjunctiva/sclera: Conjunctivae normal.      Pupils: Pupils are equal, round, and reactive to light.     Neck:      Thyroid: No thyromegaly.     Cardiovascular:      Rate and Rhythm: Normal rate.      Heart sounds: Normal heart sounds.   Pulmonary:      Effort: Pulmonary effort is normal.      Breath sounds: Normal breath sounds. No wheezing or rales.   Abdominal:      General: Bowel sounds are normal. There is no distension.      Palpations: Abdomen is soft.      Tenderness: There is no abdominal tenderness.     Musculoskeletal:         General: No tenderness.      Cervical back: Neck supple.   Lymphadenopathy:      Cervical: No cervical adenopathy.     Skin:     General: Skin is warm and dry.      Findings: No rash.     Neurological:      Mental Status: She is alert and oriented to person, place, and time.      Cranial Nerves: No cranial nerve deficit.      Coordination: Coordination normal.     Psychiatric:         Behavior: Behavior normal.         Thought Content: Thought content normal.         Judgment: Judgment normal.

## 2025-06-30 NOTE — PATIENT INSTRUCTIONS
"Patient Education     Routine physical for adults   The Basics   Written by the doctors and editors at [facility]   What is a physical? -- A physical is a routine visit, or \"check-up,\" with your doctor. You might also hear it called a \"wellness visit\" or \"preventive visit.\"  During each visit, the doctor will:   Ask about your physical and mental health   Ask about your habits, behaviors, and lifestyle   Do an exam   Give you vaccines if needed   Talk to you about any medicines you take   Give advice about your health   Answer your questions  Getting regular check-ups is an important part of taking care of your health. It can help your doctor find and treat any problems you have. But it's also important for preventing health problems.  A routine physical is different from a \"sick visit.\" A sick visit is when you see a doctor because of a health concern or problem. Since physicals are scheduled ahead of time, you can think about what you want to ask the doctor.  How often should I get a physical? -- It depends on your age and health. In general, for people age 21 years and older:   If you are younger than 50 years, you might be able to get a physical every 3 years.   If you are 50 years or older, your doctor might recommend a physical every year.  If you have an ongoing health condition, like diabetes or high blood pressure, your doctor will probably want to see you more often.  What happens during a physical? -- In general, each visit will include:   Physical exam - The doctor or nurse will check your height, weight, heart rate, and blood pressure. They will also look at your eyes and ears. They will ask about how you are feeling and whether you have any symptoms that bother you.   Medicines - It's a good idea to bring a list of all the medicines you take to each doctor visit. Your doctor will talk to you about your medicines and answer any questions. Tell them if you are having any side effects that bother you. You " "should also tell them if you are having trouble paying for any of your medicines.   Habits and behaviors - This includes:   Your diet   Your exercise habits   Whether you smoke, drink alcohol, or use drugs   Whether you are sexually active   Whether you feel safe at home  Your doctor will talk to you about things you can do to improve your health and lower your risk of health problems. They will also offer help and support. For example, if you want to quit smoking, they can give you advice and might prescribe medicines. If you want to improve your diet or get more physical activity, they can help you with this, too.   Lab tests, if needed - The tests you get will depend on your age and situation. For example, your doctor might want to check your:   Cholesterol   Blood sugar   Iron level   Vaccines - The recommended vaccines will depend on your age, health, and what vaccines you already had. Vaccines are very important because they can prevent certain serious or deadly infections.   Discussion of screening - \"Screening\" means checking for diseases or other health problems before they cause symptoms. Your doctor can recommend screening based on your age, risk, and preferences. This might include tests to check for:   Cancer, such as breast, prostate, cervical, ovarian, colorectal, prostate, lung, or skin cancer   Sexually transmitted infections, such as chlamydia and gonorrhea   Mental health conditions like depression and anxiety  Your doctor will talk to you about the different types of screening tests. They can help you decide which screenings to have. They can also explain what the results might mean.   Answering questions - The physical is a good time to ask the doctor or nurse questions about your health. If needed, they can refer you to other doctors or specialists, too.  Adults older than 65 years often need other care, too. As you get older, your doctor will talk to you about:   How to prevent falling at " home   Hearing or vision tests   Memory testing   How to take your medicines safely   Making sure that you have the help and support you need at home  All topics are updated as new evidence becomes available and our peer review process is complete.  This topic retrieved from [employer] on: May 02, 2024.  Topic 411888 Version 1.0  Release: 32.4.3 - C32.122  © 2024 UpToDate, Inc. and/or its affiliates. All rights reserved.  Consumer Information Use and Disclaimer   Disclaimer: This generalized information is a limited summary of diagnosis, treatment, and/or medication information. It is not meant to be comprehensive and should be used as a tool to help the user understand and/or assess potential diagnostic and treatment options. It does NOT include all information about conditions, treatments, medications, side effects, or risks that may apply to a specific patient. It is not intended to be medical advice or a substitute for the medical advice, diagnosis, or treatment of a health care provider based on the health care provider's examination and assessment of a patient's specific and unique circumstances. Patients must speak with a health care provider for complete information about their health, medical questions, and treatment options, including any risks or benefits regarding use of medications. This information does not endorse any treatments or medications as safe, effective, or approved for treating a specific patient. UpToDate, Inc. and its affiliates disclaim any warranty or liability relating to this information or the use thereof.The use of this information is governed by the Terms of Use, available at https://www.woltersRachiouwer.com/en/know/clinical-effectiveness-terms. 2024© UpToDate, Inc. and its affiliates and/or licensors. All rights reserved.  Copyright   © 2024 UpToDate, Inc. and/or its affiliates. All rights reserved.

## 2025-07-23 DIAGNOSIS — I49.3 FREQUENT PVCS: ICD-10-CM

## 2025-07-23 DIAGNOSIS — R00.2 PALPITATIONS: ICD-10-CM

## 2025-07-25 RX ORDER — METOPROLOL SUCCINATE 25 MG/1
TABLET, EXTENDED RELEASE ORAL
Qty: 270 TABLET | Refills: 1 | Status: SHIPPED | OUTPATIENT
Start: 2025-07-25

## 2025-08-07 ENCOUNTER — OFFICE VISIT (OUTPATIENT)
Dept: GASTROENTEROLOGY | Facility: MEDICAL CENTER | Age: 55
End: 2025-08-07

## 2025-08-07 VITALS
SYSTOLIC BLOOD PRESSURE: 131 MMHG | HEIGHT: 62 IN | BODY MASS INDEX: 42.69 KG/M2 | DIASTOLIC BLOOD PRESSURE: 90 MMHG | HEART RATE: 75 BPM | OXYGEN SATURATION: 98 % | WEIGHT: 232 LBS | TEMPERATURE: 98.2 F

## 2025-08-07 DIAGNOSIS — K52.9 CHRONIC DIARRHEA: Primary | ICD-10-CM

## 2025-08-07 DIAGNOSIS — K30 DELAYED GASTRIC EMPTYING: ICD-10-CM

## 2025-08-07 DIAGNOSIS — R15.9 INCONTINENCE OF FECES, UNSPECIFIED FECAL INCONTINENCE TYPE: ICD-10-CM

## 2025-08-07 DIAGNOSIS — R15.2 RECTAL URGENCY: ICD-10-CM

## 2025-08-07 RX ORDER — DICYCLOMINE HCL 20 MG
20 TABLET ORAL EVERY 6 HOURS
Qty: 120 TABLET | Refills: 1 | Status: SHIPPED | OUTPATIENT
Start: 2025-08-07

## 2025-08-08 ENCOUNTER — EVALUATION (OUTPATIENT)
Dept: PHYSICAL THERAPY | Facility: MEDICAL CENTER | Age: 55
End: 2025-08-08
Payer: COMMERCIAL

## 2025-08-08 ENCOUNTER — APPOINTMENT (OUTPATIENT)
Dept: LAB | Facility: MEDICAL CENTER | Age: 55
End: 2025-08-08
Payer: COMMERCIAL

## 2025-08-08 DIAGNOSIS — M25.511 RIGHT SHOULDER PAIN, UNSPECIFIED CHRONICITY: Primary | ICD-10-CM

## 2025-08-08 PROCEDURE — 97161 PT EVAL LOW COMPLEX 20 MIN: CPT | Performed by: PHYSICAL THERAPIST

## 2025-08-08 PROCEDURE — 97110 THERAPEUTIC EXERCISES: CPT | Performed by: PHYSICAL THERAPIST

## 2025-08-11 ENCOUNTER — OFFICE VISIT (OUTPATIENT)
Dept: PHYSICAL THERAPY | Facility: MEDICAL CENTER | Age: 55
End: 2025-08-11
Payer: COMMERCIAL

## 2025-08-18 ENCOUNTER — OFFICE VISIT (OUTPATIENT)
Dept: PHYSICAL THERAPY | Facility: MEDICAL CENTER | Age: 55
End: 2025-08-18
Attending: FAMILY MEDICINE
Payer: COMMERCIAL

## 2025-08-18 DIAGNOSIS — M25.511 RIGHT SHOULDER PAIN, UNSPECIFIED CHRONICITY: Primary | ICD-10-CM

## 2025-08-18 PROCEDURE — 97112 NEUROMUSCULAR REEDUCATION: CPT

## 2025-08-18 PROCEDURE — 97140 MANUAL THERAPY 1/> REGIONS: CPT

## 2025-08-18 PROCEDURE — 97110 THERAPEUTIC EXERCISES: CPT

## 2025-08-21 ENCOUNTER — OFFICE VISIT (OUTPATIENT)
Dept: PHYSICAL THERAPY | Facility: MEDICAL CENTER | Age: 55
End: 2025-08-21
Attending: FAMILY MEDICINE
Payer: COMMERCIAL

## 2025-08-21 DIAGNOSIS — M25.511 RIGHT SHOULDER PAIN, UNSPECIFIED CHRONICITY: Primary | ICD-10-CM

## 2025-08-21 PROCEDURE — 97110 THERAPEUTIC EXERCISES: CPT

## 2025-08-21 PROCEDURE — 97140 MANUAL THERAPY 1/> REGIONS: CPT

## 2025-08-21 PROCEDURE — 97112 NEUROMUSCULAR REEDUCATION: CPT
